# Patient Record
Sex: FEMALE | Race: WHITE | Employment: PART TIME | URBAN - METROPOLITAN AREA
[De-identification: names, ages, dates, MRNs, and addresses within clinical notes are randomized per-mention and may not be internally consistent; named-entity substitution may affect disease eponyms.]

---

## 2017-10-01 ENCOUNTER — APPOINTMENT (OUTPATIENT)
Dept: GENERAL RADIOLOGY | Age: 42
End: 2017-10-01
Payer: MEDICAID

## 2017-10-01 ENCOUNTER — HOSPITAL ENCOUNTER (EMERGENCY)
Age: 42
Discharge: HOME | End: 2017-10-01
Attending: EMERGENCY MEDICINE
Payer: MEDICAID

## 2017-10-01 VITALS
OXYGEN SATURATION: 100 % | WEIGHT: 104 LBS | HEART RATE: 94 BPM | HEIGHT: 65 IN | RESPIRATION RATE: 16 BRPM | BODY MASS INDEX: 17.33 KG/M2 | SYSTOLIC BLOOD PRESSURE: 139 MMHG | TEMPERATURE: 98.3 F | DIASTOLIC BLOOD PRESSURE: 96 MMHG

## 2017-10-01 DIAGNOSIS — J40 BRONCHITIS: Primary | ICD-10-CM

## 2017-10-01 PROCEDURE — 99284 EMERGENCY DEPT VISIT MOD MDM: CPT

## 2017-10-01 PROCEDURE — 71020 XR CHEST STANDARD TWO VW: CPT

## 2017-10-01 PROCEDURE — 6370000000 HC RX 637 (ALT 250 FOR IP): Performed by: PHYSICIAN ASSISTANT

## 2017-10-01 RX ORDER — AZITHROMYCIN 250 MG/1
TABLET, FILM COATED ORAL
Qty: 1 PACKET | Refills: 0 | Status: SHIPPED | OUTPATIENT
Start: 2017-10-01 | End: 2017-10-11

## 2017-10-01 RX ORDER — PREDNISONE 50 MG/1
50 TABLET ORAL DAILY
Qty: 4 TABLET | Refills: 0 | Status: SHIPPED | OUTPATIENT
Start: 2017-10-01 | End: 2018-02-15 | Stop reason: CLARIF

## 2017-10-01 RX ORDER — ACETAMINOPHEN AND CODEINE PHOSPHATE 300; 30 MG/1; MG/1
1 TABLET ORAL 3 TIMES DAILY PRN
Qty: 10 TABLET | Refills: 0 | Status: SHIPPED | OUTPATIENT
Start: 2017-10-01 | End: 2018-02-15 | Stop reason: CLARIF

## 2017-10-01 RX ORDER — ACETAMINOPHEN AND CODEINE PHOSPHATE 300; 30 MG/1; MG/1
2 TABLET ORAL ONCE
Status: COMPLETED | OUTPATIENT
Start: 2017-10-01 | End: 2017-10-01

## 2017-10-01 RX ADMIN — ACETAMINOPHEN AND CODEINE PHOSPHATE 2 TABLET: 300; 30 TABLET ORAL at 19:32

## 2017-10-01 ASSESSMENT — PAIN SCALES - GENERAL
PAINLEVEL_OUTOF10: 5
PAINLEVEL_OUTOF10: 5

## 2017-10-01 ASSESSMENT — PAIN DESCRIPTION - PAIN TYPE: TYPE: ACUTE PAIN

## 2017-10-01 ASSESSMENT — PAIN DESCRIPTION - LOCATION: LOCATION: CHEST

## 2017-10-01 NOTE — ED AVS SNAPSHOT
this packet, you will find information about the topics listed below:    · Instructions about your medications including a list of your home medications  · A summary of your hospital visit  · Follow-up appointments once you have left the hospital  · Your care plan at home      You may receive a survey regarding the care you received during your stay. Your input is valuable to us. We encourage you to complete and return your survey in the envelope provided. We hope you will choose us in the future for your healthcare needs. Patient Information     Patient Name LOCO Murphy 1975      Care Provided at:     Name Address Phone       [address]  [address] [phone]            Your Visit    Here you will find information about your visit, including the reason for your visit. Please take this sheet with you when you visit your doctor or other health care provider in the future. It will help determine the best possible medical care for you at that time. If you have any questions once you leave the hospital, please call the department phone number listed below. Diagnoses this visit     Your diagnosis was BRONCHITIS. Visit Information     Date of Visit Department Dept Phone    10/1/2017 [facility] [phone]      You were seen by     You were seen by Mauro Prasad MD and BARBARA Escalante. Follow-up Appointments    Below is a list of your follow-up and future appointments. This may not be a complete list as you may have made appointments directly with providers that we are not aware of or your providers may have made some for you. Please call your providers to confirm appointments. It is important to keep your appointments. Please bring your current insurance card, photo ID, co-pay, and all medication bottles to your appointment. If self-pay, payment is expected at the time of service.         Follow-up Information Schedule an appointment as soon as possible for a visit with Chandni Fields. Contact information:    [address] [place] [phone]          Follow up with [facility] ED. Specialty:  Emergency Medicine    Why:  For worsening symptoms, or any other concern    Contact information:    Yumiko Garcia [address] [id number] [phone]      Preventive Care        Date Due    HIV screening is recommended for all people regardless of risk factors  aged 15-65 years at least once (lifetime) who have never been HIV tested. 9/14/1989    Tetanus Combination Vaccine (1 - Tdap) 9/14/1993    Pneumococcal Vaccine - Pneumovax for adults aged 19-64 years with: chronic heart disease, chronic lung disease, diabetes mellitus, alcoholism, chronic liver disease, or cigarette smoking. (1 of 1 - PPSV23) 9/14/1993    Pap Smear 9/14/1995    Cholesterol Screening 9/14/2014    Yearly Flu Vaccine (1) 9/1/2017                 Care Plan Once You Return Home    This section includes instructions you will need to follow once you leave the hospital.  Your care team will discuss these with you, so you and those caring for you know how to best care for your health needs at home. This section may also include educational information about certain health topics that may be of help to you. Important Information if you smoke or are exposed to smoking       SMOKING: QUIT SMOKING. THIS IS THE MOST IMPORTANT ACTION YOU CAN TAKE TO IMPROVE YOUR CURRENT AND FUTURE HEALTH. Call the [address] [place] [address] at [place] NOW ([phone])    Smoking harms nonsmokers. When nonsmokers are around people who smoke, they absorb nicotine, carbon monoxide, and other ingredients of tobacco smoke.      DO NOT SMOKE AROUND CHILDREN     Children exposed to secondhand smoke are at an increased risk of:  Sudden Infant Death Syndrome (SIDS), acute respiratory infections, inflammation of the middle ear, and severe asthma. Over a longer time, it causes heart disease and lung cancer. There is no safe level of exposure to secondhand smoke. Important information for a smoker       SMOKING: QUIT SMOKING. THIS IS THE MOST IMPORTANT ACTION YOU CAN TAKE TO IMPROVE YOUR CURRENT AND FUTURE HEALTH. Call the [address] [place] [address] at [place] NOW ([phone])    Smoking harms nonsmokers. When nonsmokers are around people who smoke, they absorb nicotine, carbon monoxide, and other ingredients of tobacco smoke. DO NOT SMOKE AROUND CHILDREN     Children exposed to secondhand smoke are at an increased risk of:  Sudden Infant Death Syndrome (SIDS), acute respiratory infections, inflammation of the middle ear, and severe asthma. Over a longer time, it causes heart disease and lung cancer. There is no safe level of exposure to secondhand smoke. Myfacepage Signup     Myfacepage allows you to send messages to your doctor, view your test results, renew your prescriptions, schedule appointments, view visit notes, and more. How Do I Sign Up? 1. In your Internet browser, go to [email or web address]  2. Click on the Sign Up Now link in the Sign In box. You will see the New Member Sign Up page. 3. Enter your Myfacepage Access Code exactly as it appears below. You will not need to use this code after youve completed the sign-up process. If you do not sign up before the expiration date, you must request a new code. Myfacepage Access Code: [email or web address]  Expires: 11/30/2017  7:56 PM    4. Enter your Social Security Number ([id number]) and Date of Birth (mm/dd/yyyy) as indicated and click Submit. You will be taken to the next sign-up page. 5. Create a Myfacepage ID. This will be your Myfacepage login ID and cannot be changed, so think of one that is secure and easy to remember. 6. Create a Myfacepage password. You can change your password at any time. 7. Enter your Password Reset Question and Answer. This can be used at a later time if you forget your password. 8. Enter your e-mail address. You will receive e-mail notification when new information is available in [address] 9. Click Sign Up. You can now view your medical record. Additional Information  If you have questions, please contact the physician practice where you receive care. Remember, MyChart is NOT to be used for urgent needs. For medical emergencies, dial 911. For questions regarding your MyChart account call [phone]. If you have a clinical question, please call your doctor's office. View your information online  ? Review your current list of  medications, immunization, and allergies. ? Review your future test results online . ? Review your discharge instructions provided by your caregivers at discharge    Certain functionality such as prescription refills, scheduling appointments or sending messages to your provider are not activated if your provider does not use Websense in his/her office    For questions regarding your MyChart account call [phone]. If you have a clinical question, please call your doctor's office. The information on all pages of the After Visit Summary has been reviewed with me, the patient and/or responsible adult, by my health care provider(s). I had the opportunity to ask questions regarding this information. I understand I should dispose of my armband safely at home to protect my health information. A complete copy of the After Visit Summary has been given to me, the patient and/or responsible adult.          Patient Signature/Responsible Adult: ___________________________________    Nurse Signature: ___________________________________  Resident/MLP Signature: ___________________________________  Attending Signature: ___________________________________    Date:____________Time:____________              Discharge Instructions

## 2017-10-01 NOTE — ED PROVIDER NOTES
[address] ED  eMERGENCY dEPARTMENT eNCOUnter      Pt Name: Merlene Seo  MRN: [id number]  [place] 1975  Date of evaluation: 10/1/2017  Provider: BARBARA Pop    CHIEF COMPLAINT       Chief Complaint   Patient presents with    Cough           HISTORY OF PRESENT ILLNESS  (Location/Symptom, Timing/Onset, Context/Setting, Quality, Duration, Modifying Factors, Severity.)   Merlene Seo is a 36 y.o. female who presents to the emergency department with mildly productive cough for the past 4 days. Denies any shortness of breath, chest pain, fevers, chills, abdominal pain, nausea, vomiting. Tobacco use: daily  Location/Symptom: cough  Duration: Intermittent  Modifying Factors: worse when laying flat, better sitting up  Severity: mild    Nursing Notes were reviewed. REVIEW OF SYSTEMS    (2-9 systems for level 4, 10 or more for level 5)     Review of Systems   C/o cough  denies shortness of breath, denies chest pain, denies any fevers or chills, denies any abdominal pain nausea or vomiting    Except as noted above the remainder of the review of systems was reviewed and negative. PAST MEDICAL HISTORY     Past Medical History:   Diagnosis Date    Prolonged emergence from general anesthesia      None otherwise stated in nurses notes    SURGICAL HISTORY       Past Surgical History:   Procedure Laterality Date    FOOT SURGERY Left     LAPAROSCOPY  3/3/16    hysteroscopy with D&C    TUBAL LIGATION       None otherwise stated in nurses notes    CURRENT MEDICATIONS       Discharge Medication List as of 10/1/2017  7:56 PM          ALLERGIES     Bactrim [sulfamethoxazole-trimethoprim]    FAMILY HISTORY           Problem Relation Age of Onset    Diabetes Mother     High Blood Pressure Mother     Cancer Father      Family Status   Relation Status    Mother     Father       None otherwise stated in nurses notes    SOCIAL HISTORY      reports that she has been smoking Cigarettes.   She has a 10.00 pack-year smoking history. She has never used smokeless tobacco. She reports that she does not drink alcohol or use illicit drugs. lives at home with others     PHYSICAL EXAM    (up to 7 for level 4, 8 or more for level 5)   ED Triage Vitals   BP Temp Temp Source Pulse Resp SpO2 Height Weight   10/01/17 1832 10/01/17 1832 10/01/17 1832 10/01/17 1832 10/01/17 1832 10/01/17 1832 10/01/17 1832 10/01/17 1832   139/96 98.3 °F (36.8 °C) Oral 94 16 100 % 5' 5\" (1.651 m) 104 lb (47.2 kg)       Physical Exam   Nursing note and vitals reviewed. Constitutional: Oriented to person, place, and time and well-developed, well-nourished. HENT: Normocephalic and atraumatic. External ears normal. Nose normal and midline. Eyes: Conjunctivae and EOM are normal. Pupils are equal, round, and reactive to light. Throat: Posterior pharynx is without erythema or exudates, airway is patent, no swelling  Cardiovascular: Normal rate, regular rhythm, normal heart sounds and intact distal pulses. Pulmonary/Chest: Effort normal and breath sounds normal. No respiratory distress. no wheezes. No rales. No chest tenderness. Coughing during exam  Musculoskeletal: Normal range of motion. Skin: Skin is warm and dry. No rash noted. No erythema. No pallor. no diaphoresis             DIAGNOSTIC RESULTS     EKG: All EKG's are interpreted by the Emergency Department Physician who either signs or Co-signs this chart in the absence of a cardiologist.        RADIOLOGY:   All plain film, CT, MRI, and formal ultrasound images (except ED bedside ultrasound) are read by the radiologist and the images and interpretations are directly viewed by the emergency physician. XR CHEST STANDARD (2 VW)   Final Result   1. No acute cardiopulmonary disease. LABS:  Labs Reviewed - No data to display    All other labs were within normal range or not returned as of this dictation.     EMERGENCY DEPARTMENT COURSE and DIFFERENTIAL DIAGNOSIS/MDM: Vitals:    Vitals:    10/01/17 1832   BP: (!) 139/96   Pulse: 94   Resp: 16   Temp: 98.3 °F (36.8 °C)   TempSrc: Oral   SpO2: 100%   Weight: 104 lb (47.2 kg)   Height: 5' 5\" (1.651 m)       t3 helped with cough. Pt is resting comfortably, no difficulty breathing, no distress. Instructed to return to the emergency room if symptoms worsen, return, or any other concern right away which is agreed by the patient. Instructed to f/u with PCP in 2-3 days for re-evaluation. ED MEDS:  Orders Placed This Encounter   Medications    acetaminophen-codeine (TYLENOL #3) 300-30 MG per tablet 2 tablet    azithromycin (ZITHROMAX) 250 MG tablet     Sig: Take 2 tablets (500 mg) on Day 1, followed by 1 tablet (250 mg) once daily on Days 2 through 5. Dispense:  1 packet     Refill:  0    predniSONE (DELTASONE) 50 MG tablet     Sig: Take 1 tablet by mouth daily     Dispense:  4 tablet     Refill:  0    acetaminophen-codeine (TYLENOL/CODEINE #3) 300-30 MG per tablet     Sig: Take 1 tablet by mouth 3 times daily as needed for Pain     Dispense:  10 tablet     Refill:  0         CONSULTS:  None    PROCEDURES:  None      FINAL IMPRESSION      1.  Bronchitis          DISPOSITION/PLAN   DISPOSITION Decision To Discharge    PATIENT REFERRED TO:  Annika Tommiealise  [address] [place] [phone]    Schedule an appointment as soon as possible for a visit      [facility] ED  [facility]  [address] [facility]  [phone]    For worsening symptoms, or any other concern      DISCHARGE MEDICATIONS:  Discharge Medication List as of 10/1/2017  7:56 PM      START taking these medications    Details   azithromycin (ZITHROMAX) 250 MG tablet Take 2 tablets (500 mg) on Day 1, followed by 1 tablet (250 mg) once daily on Days 2 through 5., Disp-1 packet, R-0Print      predniSONE (DELTASONE) 50 MG tablet Take 1 tablet by mouth daily, Disp-4 tablet, R-0Print      acetaminophen-codeine (TYLENOL/CODEINE #3) 300-30 MG per tablet Take 1 tablet by mouth 3 times daily as needed for Pain, Disp-10 tablet, R-0Print             (Please note that portions of this note were completed with a voice recognition program.  Efforts were made to edit the dictations but occasionally words are mis-transcribed.)    Elyse Gore, [address] [place] [address]  10/01/17 2019

## 2018-02-15 ENCOUNTER — OFFICE VISIT (OUTPATIENT)
Dept: OBGYN CLINIC | Age: 43
End: 2018-02-15
Payer: COMMERCIAL

## 2018-02-15 VITALS
HEART RATE: 76 BPM | DIASTOLIC BLOOD PRESSURE: 90 MMHG | SYSTOLIC BLOOD PRESSURE: 131 MMHG | RESPIRATION RATE: 18 BRPM | HEIGHT: 65 IN | BODY MASS INDEX: 20.16 KG/M2 | WEIGHT: 121 LBS

## 2018-02-15 DIAGNOSIS — N92.6 IRREGULAR BLEEDING: ICD-10-CM

## 2018-02-15 DIAGNOSIS — R10.2 PELVIC PAIN IN FEMALE: Primary | ICD-10-CM

## 2018-02-15 PROCEDURE — G8484 FLU IMMUNIZE NO ADMIN: HCPCS | Performed by: SPECIALIST

## 2018-02-15 PROCEDURE — 99213 OFFICE O/P EST LOW 20 MIN: CPT | Performed by: SPECIALIST

## 2018-02-15 PROCEDURE — G8427 DOCREV CUR MEDS BY ELIG CLIN: HCPCS | Performed by: SPECIALIST

## 2018-02-15 PROCEDURE — 4004F PT TOBACCO SCREEN RCVD TLK: CPT | Performed by: SPECIALIST

## 2018-02-15 PROCEDURE — G8420 CALC BMI NORM PARAMETERS: HCPCS | Performed by: SPECIALIST

## 2018-02-15 ASSESSMENT — ENCOUNTER SYMPTOMS
VOMITING: 0
CONSTIPATION: 0
NAUSEA: 0
ABDOMINAL DISTENTION: 0
DIARRHEA: 0
ABDOMINAL PAIN: 0
COUGH: 0
EYE PAIN: 0
APNEA: 0

## 2018-02-15 NOTE — PROGRESS NOTES
Subjective:      Patient ID: Lissette Vergara is a 36 y.o. female. Chief Complaint   Patient presents with    Menstrual Problem     patient is here today because she is having periods every 2 weeks. This started happening in December. She is also having constant pelvic pain. Pain is is relieved with ibuprofen. She has been having abdominal swelling. BP (!) 131/90 (Site: Right Arm, Position: Sitting, Cuff Size: Medium Adult)   Pulse 76   Resp 18   Ht 5' 5\" (1.651 m)   Wt 121 lb (54.9 kg)   LMP 02/15/2018   BMI 20.14 kg/m²   Patient's last menstrual period was 02/15/2018. [id number]    Past Medical History:   Diagnosis Date    Prolonged emergence from general anesthesia      No current Epic-ordered outpatient prescriptions on file. No current Epic-ordered facility-administered medications on file. Problem List Items Addressed This Visit     Pelvic pain in female - Primary    Relevant Orders    US Non OB Transvaginal    US Pelvis Complete      Other Visit Diagnoses     Irregular bleeding        Relevant Orders    US Non OB Transvaginal    US Pelvis Complete        Allergies   Allergen Reactions    Bactrim [Sulfamethoxazole-Trimethoprim]      Orders Placed This Encounter   Procedures    US Non OB Transvaginal     Begin with transabdominal imaging. Standing Status:   Future     Standing Expiration Date:   2/15/2019     Order Specific Question:   Reason for exam:     Answer:   abnormal bleeding    US Pelvis Complete     Standing Status:   Future     Standing Expiration Date:   2/15/2019        Patient is here today complaining of irregular irregular bleeding and pelvic pain. She states that she has been having periods every 2 weeks since December 2017. She states that the pain is constant but does feel better when she take Ibuprofen. She states that her abdomen swells with the pain and she his very uncomfortable. She denies nausea, vomiting and fever.          Review of Systems Constitutional: Negative for activity change, appetite change and fever. HENT: Negative for ear discharge and ear pain. Eyes: Negative for pain and visual disturbance. Respiratory: Negative for apnea and cough. Cardiovascular: Negative for chest pain, palpitations and leg swelling. Gastrointestinal: Negative for abdominal distention, abdominal pain, constipation, diarrhea, nausea and vomiting. Endocrine: Negative. Genitourinary: Positive for menstrual problem and pelvic pain. Negative for difficulty urinating and dysuria. Musculoskeletal: Negative for neck pain and neck stiffness. Skin: Negative. Neurological: Negative for light-headedness and numbness. Hematological: Negative. Does not bruise/bleed easily. Objective:   Physical Exam   Constitutional: She is oriented to person, place, and time. Vital signs are normal. She appears well-developed and well-nourished. HENT:   Head: Normocephalic and atraumatic. Neck: Normal range of motion. Neck supple. No thyromegaly present. Cardiovascular: Normal rate and regular rhythm. Pulmonary/Chest: Effort normal and breath sounds normal. She has no wheezes. Abdominal: Soft. Bowel sounds are normal. She exhibits no distension and no mass. There is no tenderness. There is no guarding. Musculoskeletal: Normal range of motion. Neurological: She is alert and oriented to person, place, and time. Skin: Skin is dry. Psychiatric: She has a normal mood and affect. Her behavior is normal. Thought content normal.   Nursing note and vitals reviewed. Assessment:       Patient with pelvic pain and irregular bleeding. Will evaluate with ultrasound. Patient with elevated blood pressure. She was advised to see her family physician for evaluation. Plan:      Orders Placed This Encounter   Procedures    US Non OB Transvaginal    US Pelvis Complete     Appointment for ultrasound.     Pietro Love am scribing for, and in

## 2018-02-26 ENCOUNTER — OFFICE VISIT (OUTPATIENT)
Dept: OBGYN CLINIC | Age: 43
End: 2018-02-26
Payer: COMMERCIAL

## 2018-02-26 DIAGNOSIS — R10.2 PELVIC PAIN IN FEMALE: ICD-10-CM

## 2018-02-26 DIAGNOSIS — N92.6 IRREGULAR BLEEDING: ICD-10-CM

## 2018-02-26 PROCEDURE — 76856 US EXAM PELVIC COMPLETE: CPT | Performed by: SPECIALIST

## 2018-02-26 PROCEDURE — 76830 TRANSVAGINAL US NON-OB: CPT | Performed by: SPECIALIST

## 2018-02-26 NOTE — PROGRESS NOTES
Please refer to the attached ultrasound report for doctors evaluation, along with management recommendations.

## 2018-02-28 ENCOUNTER — OFFICE VISIT (OUTPATIENT)
Dept: OBGYN CLINIC | Age: 43
End: 2018-02-28
Payer: COMMERCIAL

## 2018-02-28 VITALS
HEIGHT: 65 IN | WEIGHT: 123.8 LBS | BODY MASS INDEX: 20.62 KG/M2 | DIASTOLIC BLOOD PRESSURE: 85 MMHG | SYSTOLIC BLOOD PRESSURE: 130 MMHG | HEART RATE: 89 BPM

## 2018-02-28 DIAGNOSIS — R10.2 PELVIC PAIN IN FEMALE: ICD-10-CM

## 2018-02-28 DIAGNOSIS — N92.6 IRREGULAR BLEEDING: ICD-10-CM

## 2018-02-28 DIAGNOSIS — D25.9 UTERINE LEIOMYOMA, UNSPECIFIED LOCATION: Primary | ICD-10-CM

## 2018-02-28 PROCEDURE — 99213 OFFICE O/P EST LOW 20 MIN: CPT | Performed by: SPECIALIST

## 2018-02-28 PROCEDURE — G8484 FLU IMMUNIZE NO ADMIN: HCPCS | Performed by: SPECIALIST

## 2018-02-28 PROCEDURE — G8427 DOCREV CUR MEDS BY ELIG CLIN: HCPCS | Performed by: SPECIALIST

## 2018-02-28 PROCEDURE — G8420 CALC BMI NORM PARAMETERS: HCPCS | Performed by: SPECIALIST

## 2018-02-28 PROCEDURE — 4004F PT TOBACCO SCREEN RCVD TLK: CPT | Performed by: SPECIALIST

## 2018-02-28 RX ORDER — IBUPROFEN 200 MG
200 TABLET ORAL EVERY 6 HOURS PRN
Status: ON HOLD | COMMUNITY
End: 2018-04-20 | Stop reason: HOSPADM

## 2018-02-28 ASSESSMENT — ENCOUNTER SYMPTOMS
VOMITING: 0
ABDOMINAL PAIN: 0
APNEA: 0
NAUSEA: 0
CONSTIPATION: 0
ABDOMINAL DISTENTION: 0
EYE PAIN: 0
DIARRHEA: 0
COUGH: 0

## 2018-02-28 NOTE — PROGRESS NOTES
pain. Negative for difficulty urinating and dysuria. Musculoskeletal: Negative for neck pain and neck stiffness. Skin: Negative. Neurological: Negative for light-headedness and numbness. Hematological: Negative. Does not bruise/bleed easily. Objective:   Physical Exam   Constitutional: She is oriented to person, place, and time. Vital signs are normal. She appears well-developed and well-nourished. HENT:   Head: Normocephalic and atraumatic. Neck: Normal range of motion. Neck supple. No thyromegaly present. Cardiovascular: Normal rate and regular rhythm. Pulmonary/Chest: Effort normal and breath sounds normal. She has no wheezes. Abdominal: Soft. Bowel sounds are normal. She exhibits no distension and no mass. There is no tenderness. There is no guarding. Musculoskeletal: Normal range of motion. Neurological: She is alert and oriented to person, place, and time. Skin: Skin is dry. Psychiatric: She has a normal mood and affect. Her behavior is normal. Thought content normal.   Nursing note and vitals reviewed. Assessment:      Patient with pelvic pain and irregular bleeding here to discuss the result of her ultrasound. Ultrasound showing uterine fibroid was reviewed and explained to patient. Treatment options were discussed, including expectant management. As patient is a chronic smoker, she is not a candidate for hormone therapy. Due to the pain and irregular bleeding, patient requests hysterectomy. Will schedule patient for LAVH. Discussed surgical procedure, risks and benefits, and all questions were answered. Plan:      Schedule for LAVH. Antony Vargas am scribing for, and in the presence of Dr. Cora Prajapati. Electronically signed by: Megan Alexander 2/28/18 4:56 PM       I agree to the above documentation placed by my scribe Megan Alexander. I reviewed the scribe's note and agree with the documented findings and plan of care.  Any areas of disagreement are noted on the chart. I have personally evaluated this patient. Additional findings are as noted. I agree with the chief complaint, past medical history, past surgical history, allergies, medications, social and family history as documented unless otherwise noted below.      Electronically signed by Raffi Hollis MD on 3/1/2018 at 3:43 AM

## 2018-03-08 ENCOUNTER — TELEPHONE (OUTPATIENT)
Dept: OBGYN CLINIC | Age: 43
End: 2018-03-08

## 2018-03-08 NOTE — LETTER
[address] [place] [address] GYN  [facility] [address]  [address] [address]  Phone: [phone]  Fax: [phone]    Mae Rowley MD        March 8, 2018    Lissette Vergara  [address]      Dear Sid Nascimento: Thank you for entrusting me to be a part of your healthcare team!    Your surgical procedure known as vaginal hysterectomy has been scheduled at [facility].      Your pre-admission testing is scheduled for 4/5/18 at 10L30 am . Your surgery is scheduled on 4/19/18 and you need to arrive by 6:00 am. Your post-operative appointment to assess your health and review your results is scheduled on 4/26/18 at 2:30 pm in my office. Please do not eat, drink or smoke after midnight the evening before your surgery. Also, please remember, it is very important to keep your appointment for pre-admission testing. Please take all the medications that you take to your pre-admission testing appointment. Your anticipated recovery time is  6 weeks. If you have any questions do not hesitate to contact my office. You may e-mail us using your MyChart or call us at [phone].     Sincerely,           Karyna Casiano MD, Ashley Silva

## 2018-04-05 ENCOUNTER — HOSPITAL ENCOUNTER (OUTPATIENT)
Dept: PREADMISSION TESTING | Age: 43
Discharge: HOME | End: 2018-04-09
Payer: COMMERCIAL

## 2018-04-05 VITALS
BODY MASS INDEX: 20.18 KG/M2 | RESPIRATION RATE: 12 BRPM | SYSTOLIC BLOOD PRESSURE: 129 MMHG | HEART RATE: 95 BPM | OXYGEN SATURATION: 95 % | HEIGHT: 65 IN | WEIGHT: 121.1 LBS | TEMPERATURE: 98.1 F | DIASTOLIC BLOOD PRESSURE: 85 MMHG

## 2018-04-05 LAB
ABO/RH: NORMAL
ABSOLUTE EOS #: 0.1 K/UL (ref 0–0.4)
ABSOLUTE IMMATURE GRANULOCYTE: ABNORMAL K/UL (ref 0–0.3)
ABSOLUTE LYMPH #: 3.1 K/UL (ref 1–4.8)
ABSOLUTE MONO #: 0.8 K/UL (ref 0.1–1.3)
ANION GAP SERPL CALCULATED.3IONS-SCNC: 13 MMOL/L (ref 9–17)
ANTIBODY SCREEN: NEGATIVE
ARM BAND NUMBER: NORMAL
BASOPHILS # BLD: 1 % (ref 0–2)
BASOPHILS ABSOLUTE: 0 K/UL (ref 0–0.2)
BLOOD BANK COMMENT: NORMAL
BUN BLDV-MCNC: 7 MG/DL (ref 6–20)
BUN/CREAT BLD: ABNORMAL (ref 9–20)
CALCIUM SERPL-MCNC: 8.7 MG/DL (ref 8.6–10.4)
CHLORIDE BLD-SCNC: 103 MMOL/L (ref 98–107)
CO2: 24 MMOL/L (ref 20–31)
CREAT SERPL-MCNC: 0.63 MG/DL (ref 0.5–0.9)
DIFFERENTIAL TYPE: ABNORMAL
EOSINOPHILS RELATIVE PERCENT: 1 % (ref 0–4)
EXPIRATION DATE: NORMAL
GFR AFRICAN AMERICAN: >60 ML/MIN
GFR NON-AFRICAN AMERICAN: >60 ML/MIN
GFR SERPL CREATININE-BSD FRML MDRD: ABNORMAL ML/MIN/{1.73_M2}
GFR SERPL CREATININE-BSD FRML MDRD: ABNORMAL ML/MIN/{1.73_M2}
GLUCOSE BLD-MCNC: 69 MG/DL (ref 70–99)
HCT VFR BLD CALC: 43.3 % (ref 36–46)
HEMOGLOBIN: 14.5 G/DL (ref 12–16)
IMMATURE GRANULOCYTES: ABNORMAL %
LYMPHOCYTES # BLD: 33 % (ref 24–44)
MCH RBC QN AUTO: 29.7 PG (ref 26–34)
MCHC RBC AUTO-ENTMCNC: 33.4 G/DL (ref 31–37)
MCV RBC AUTO: 88.8 FL (ref 80–100)
MONOCYTES # BLD: 9 % (ref 1–7)
NRBC AUTOMATED: ABNORMAL PER 100 WBC
PDW BLD-RTO: 14.7 % (ref 11.5–14.9)
PLATELET # BLD: 331 K/UL (ref 150–450)
PLATELET ESTIMATE: ABNORMAL
PMV BLD AUTO: 8.6 FL (ref 6–12)
POTASSIUM SERPL-SCNC: 4.4 MMOL/L (ref 3.7–5.3)
RBC # BLD: 4.87 M/UL (ref 4–5.2)
RBC # BLD: ABNORMAL 10*6/UL
SEG NEUTROPHILS: 56 % (ref 36–66)
SEGMENTED NEUTROPHILS ABSOLUTE COUNT: 5.4 K/UL (ref 1.3–9.1)
SODIUM BLD-SCNC: 140 MMOL/L (ref 135–144)
WBC # BLD: 9.5 K/UL (ref 3.5–11)
WBC # BLD: ABNORMAL 10*3/UL

## 2018-04-05 PROCEDURE — 86901 BLOOD TYPING SEROLOGIC RH(D): CPT

## 2018-04-05 PROCEDURE — 85025 COMPLETE CBC W/AUTO DIFF WBC: CPT

## 2018-04-05 PROCEDURE — 86850 RBC ANTIBODY SCREEN: CPT

## 2018-04-05 PROCEDURE — 86900 BLOOD TYPING SEROLOGIC ABO: CPT

## 2018-04-05 PROCEDURE — 36415 COLL VENOUS BLD VENIPUNCTURE: CPT

## 2018-04-05 PROCEDURE — 80048 BASIC METABOLIC PNL TOTAL CA: CPT

## 2018-04-06 ENCOUNTER — ANESTHESIA EVENT (OUTPATIENT)
Dept: OPERATING ROOM | Age: 43
End: 2018-04-06
Payer: COMMERCIAL

## 2018-04-19 ENCOUNTER — ANESTHESIA (OUTPATIENT)
Dept: OPERATING ROOM | Age: 43
End: 2018-04-19
Payer: COMMERCIAL

## 2018-04-19 ENCOUNTER — HOSPITAL ENCOUNTER (OUTPATIENT)
Age: 43
Discharge: HOME | End: 2018-04-20
Attending: SPECIALIST | Admitting: SPECIALIST
Payer: COMMERCIAL

## 2018-04-19 VITALS — OXYGEN SATURATION: 100 % | TEMPERATURE: 97.3 F | DIASTOLIC BLOOD PRESSURE: 59 MMHG | SYSTOLIC BLOOD PRESSURE: 99 MMHG

## 2018-04-19 DIAGNOSIS — Z90.710 S/P LAPAROSCOPIC HYSTERECTOMY: Primary | ICD-10-CM

## 2018-04-19 PROBLEM — Z98.51 HISTORY OF TUBAL LIGATION: Status: ACTIVE | Noted: 2018-04-19

## 2018-04-19 PROBLEM — O00.90 ECTOPIC PREGNANCY: Status: ACTIVE | Noted: 2018-04-19

## 2018-04-19 LAB
-: ABNORMAL
-: NORMAL
ABSOLUTE EOS #: 0 K/UL (ref 0–0.4)
ABSOLUTE IMMATURE GRANULOCYTE: ABNORMAL K/UL (ref 0–0.3)
ABSOLUTE LYMPH #: 0.89 K/UL (ref 1–4.8)
ABSOLUTE MONO #: 0.3 K/UL (ref 0.1–1.3)
AMORPHOUS: ABNORMAL
BACTERIA: ABNORMAL
BASOPHILS # BLD: 0 % (ref 0–2)
BASOPHILS ABSOLUTE: 0 K/UL (ref 0–0.2)
BILIRUBIN URINE: ABNORMAL
CASTS UA: ABNORMAL /LPF
COLOR: YELLOW
COMMENT UA: ABNORMAL
CRYSTALS, UA: ABNORMAL /HPF
DIFFERENTIAL TYPE: ABNORMAL
EOSINOPHILS RELATIVE PERCENT: 0 % (ref 0–4)
EPITHELIAL CELLS UA: ABNORMAL /HPF
GLUCOSE URINE: NEGATIVE
HCG, PREGNANCY URINE (POC): NEGATIVE
HCT VFR BLD CALC: 38.3 % (ref 36–46)
HEMOGLOBIN: 12.6 G/DL (ref 12–16)
IMMATURE GRANULOCYTES: ABNORMAL %
KETONES, URINE: ABNORMAL
LEUKOCYTE ESTERASE, URINE: NEGATIVE
LYMPHOCYTES # BLD: 6 % (ref 24–44)
MCH RBC QN AUTO: 29.1 PG (ref 26–34)
MCHC RBC AUTO-ENTMCNC: 32.9 G/DL (ref 31–37)
MCV RBC AUTO: 88.5 FL (ref 80–100)
MONOCYTES # BLD: 2 % (ref 1–7)
MORPHOLOGY: NORMAL
MUCUS: ABNORMAL
NITRITE, URINE: NEGATIVE
NRBC AUTOMATED: ABNORMAL PER 100 WBC
OTHER OBSERVATIONS UA: ABNORMAL
PDW BLD-RTO: 14.4 % (ref 11.5–14.9)
PH UA: 6 (ref 5–8)
PLATELET # BLD: 267 K/UL (ref 150–450)
PLATELET ESTIMATE: ABNORMAL
PMV BLD AUTO: 9.1 FL (ref 6–12)
PROTEIN UA: NEGATIVE
RBC # BLD: 4.33 M/UL (ref 4–5.2)
RBC # BLD: ABNORMAL 10*6/UL
RBC UA: ABNORMAL /HPF
RENAL EPITHELIAL, UA: ABNORMAL /HPF
SEG NEUTROPHILS: 92 % (ref 36–66)
SEGMENTED NEUTROPHILS ABSOLUTE COUNT: 13.71 K/UL (ref 1.3–9.1)
SPECIFIC GRAVITY UA: 1.02 (ref 1–1.03)
TRICHOMONAS: ABNORMAL
TURBIDITY: ABNORMAL
URINE HGB: ABNORMAL
UROBILINOGEN, URINE: NORMAL
WBC # BLD: 14.9 K/UL (ref 3.5–11)
WBC # BLD: ABNORMAL 10*3/UL
WBC UA: ABNORMAL /HPF
YEAST: ABNORMAL

## 2018-04-19 PROCEDURE — 6360000002 HC RX W HCPCS: Performed by: ANESTHESIOLOGY

## 2018-04-19 PROCEDURE — 6360000002 HC RX W HCPCS: Performed by: NURSE ANESTHETIST, CERTIFIED REGISTERED

## 2018-04-19 PROCEDURE — 85025 COMPLETE CBC W/AUTO DIFF WBC: CPT

## 2018-04-19 PROCEDURE — 6360000002 HC RX W HCPCS: Performed by: OBSTETRICS & GYNECOLOGY

## 2018-04-19 PROCEDURE — 87086 URINE CULTURE/COLONY COUNT: CPT

## 2018-04-19 PROCEDURE — 3600000014 HC SURGERY LEVEL 4 ADDTL 15MIN: Performed by: SPECIALIST

## 2018-04-19 PROCEDURE — 2580000003 HC RX 258: Performed by: ANESTHESIOLOGY

## 2018-04-19 PROCEDURE — 2720000010 HC SURG SUPPLY STERILE: Performed by: SPECIALIST

## 2018-04-19 PROCEDURE — A6402 STERILE GAUZE <= 16 SQ IN: HCPCS | Performed by: SPECIALIST

## 2018-04-19 PROCEDURE — 7100000001 HC PACU RECOVERY - ADDTL 15 MIN: Performed by: SPECIALIST

## 2018-04-19 PROCEDURE — 7100000000 HC PACU RECOVERY - FIRST 15 MIN: Performed by: SPECIALIST

## 2018-04-19 PROCEDURE — 88307 TISSUE EXAM BY PATHOLOGIST: CPT

## 2018-04-19 PROCEDURE — 84703 CHORIONIC GONADOTROPIN ASSAY: CPT

## 2018-04-19 PROCEDURE — 58552 LAPARO-VAG HYST INCL T/O: CPT | Performed by: SPECIALIST

## 2018-04-19 PROCEDURE — 6370000000 HC RX 637 (ALT 250 FOR IP): Performed by: OBSTETRICS & GYNECOLOGY

## 2018-04-19 PROCEDURE — 3700000000 HC ANESTHESIA ATTENDED CARE: Performed by: SPECIALIST

## 2018-04-19 PROCEDURE — 3700000001 HC ADD 15 MINUTES (ANESTHESIA): Performed by: SPECIALIST

## 2018-04-19 PROCEDURE — 3600000004 HC SURGERY LEVEL 4 BASE: Performed by: SPECIALIST

## 2018-04-19 PROCEDURE — 36415 COLL VENOUS BLD VENIPUNCTURE: CPT

## 2018-04-19 PROCEDURE — 2500000003 HC RX 250 WO HCPCS: Performed by: SPECIALIST

## 2018-04-19 PROCEDURE — 81001 URINALYSIS AUTO W/SCOPE: CPT

## 2018-04-19 PROCEDURE — 2500000003 HC RX 250 WO HCPCS: Performed by: NURSE ANESTHETIST, CERTIFIED REGISTERED

## 2018-04-19 PROCEDURE — 2780000010 HC IMPLANT OTHER: Performed by: SPECIALIST

## 2018-04-19 PROCEDURE — 2580000003 HC RX 258: Performed by: OBSTETRICS & GYNECOLOGY

## 2018-04-19 RX ORDER — ONDANSETRON 2 MG/ML
4 INJECTION INTRAMUSCULAR; INTRAVENOUS EVERY 6 HOURS PRN
Status: DISCONTINUED | OUTPATIENT
Start: 2018-04-19 | End: 2018-04-19

## 2018-04-19 RX ORDER — SODIUM CHLORIDE 9 MG/ML
INJECTION, SOLUTION INTRAVENOUS CONTINUOUS
Status: DISCONTINUED | OUTPATIENT
Start: 2018-04-19 | End: 2018-04-20 | Stop reason: HOSPADM

## 2018-04-19 RX ORDER — SODIUM CHLORIDE, SODIUM LACTATE, POTASSIUM CHLORIDE, CALCIUM CHLORIDE 600; 310; 30; 20 MG/100ML; MG/100ML; MG/100ML; MG/100ML
INJECTION, SOLUTION INTRAVENOUS CONTINUOUS
Status: DISCONTINUED | OUTPATIENT
Start: 2018-04-19 | End: 2018-04-19

## 2018-04-19 RX ORDER — FENTANYL CITRATE 50 UG/ML
50 INJECTION, SOLUTION INTRAMUSCULAR; INTRAVENOUS EVERY 5 MIN PRN
Status: DISCONTINUED | OUTPATIENT
Start: 2018-04-19 | End: 2018-04-19 | Stop reason: HOSPADM

## 2018-04-19 RX ORDER — MORPHINE SULFATE 2 MG/ML
2 INJECTION, SOLUTION INTRAMUSCULAR; INTRAVENOUS EVERY 5 MIN PRN
Status: DISCONTINUED | OUTPATIENT
Start: 2018-04-19 | End: 2018-04-19 | Stop reason: HOSPADM

## 2018-04-19 RX ORDER — LABETALOL HYDROCHLORIDE 5 MG/ML
5 INJECTION, SOLUTION INTRAVENOUS EVERY 10 MIN PRN
Status: DISCONTINUED | OUTPATIENT
Start: 2018-04-19 | End: 2018-04-19 | Stop reason: HOSPADM

## 2018-04-19 RX ORDER — MORPHINE SULFATE 2 MG/ML
2 INJECTION, SOLUTION INTRAMUSCULAR; INTRAVENOUS EVERY 4 HOURS PRN
Status: DISCONTINUED | OUTPATIENT
Start: 2018-04-19 | End: 2018-04-19

## 2018-04-19 RX ORDER — DEXAMETHASONE SODIUM PHOSPHATE 4 MG/ML
INJECTION, SOLUTION INTRA-ARTICULAR; INTRALESIONAL; INTRAMUSCULAR; INTRAVENOUS; SOFT TISSUE PRN
Status: DISCONTINUED | OUTPATIENT
Start: 2018-04-19 | End: 2018-04-19 | Stop reason: SDUPTHER

## 2018-04-19 RX ORDER — BUPIVACAINE HYDROCHLORIDE 5 MG/ML
INJECTION, SOLUTION EPIDURAL; INTRACAUDAL PRN
Status: DISCONTINUED | OUTPATIENT
Start: 2018-04-19 | End: 2018-04-19 | Stop reason: HOSPADM

## 2018-04-19 RX ORDER — OXYCODONE HYDROCHLORIDE AND ACETAMINOPHEN 5; 325 MG/1; MG/1
2 TABLET ORAL EVERY 4 HOURS PRN
Status: DISCONTINUED | OUTPATIENT
Start: 2018-04-20 | End: 2018-04-20 | Stop reason: HOSPADM

## 2018-04-19 RX ORDER — PROPOFOL 10 MG/ML
INJECTION, EMULSION INTRAVENOUS PRN
Status: DISCONTINUED | OUTPATIENT
Start: 2018-04-19 | End: 2018-04-19 | Stop reason: SDUPTHER

## 2018-04-19 RX ORDER — HYDROCODONE BITARTRATE AND ACETAMINOPHEN 5; 325 MG/1; MG/1
1 TABLET ORAL PRN
Status: DISCONTINUED | OUTPATIENT
Start: 2018-04-19 | End: 2018-04-19 | Stop reason: HOSPADM

## 2018-04-19 RX ORDER — HEPARIN SODIUM 5000 [USP'U]/ML
5000 INJECTION, SOLUTION INTRAVENOUS; SUBCUTANEOUS EVERY 12 HOURS
Status: COMPLETED | OUTPATIENT
Start: 2018-04-19 | End: 2018-04-20

## 2018-04-19 RX ORDER — FENTANYL CITRATE 50 UG/ML
25 INJECTION, SOLUTION INTRAMUSCULAR; INTRAVENOUS EVERY 5 MIN PRN
Status: DISCONTINUED | OUTPATIENT
Start: 2018-04-19 | End: 2018-04-19 | Stop reason: HOSPADM

## 2018-04-19 RX ORDER — DIPHENHYDRAMINE HCL 25 MG
25 TABLET ORAL EVERY 6 HOURS PRN
Status: DISCONTINUED | OUTPATIENT
Start: 2018-04-19 | End: 2018-04-20 | Stop reason: HOSPADM

## 2018-04-19 RX ORDER — ROCURONIUM BROMIDE 10 MG/ML
INJECTION, SOLUTION INTRAVENOUS PRN
Status: DISCONTINUED | OUTPATIENT
Start: 2018-04-19 | End: 2018-04-19 | Stop reason: SDUPTHER

## 2018-04-19 RX ORDER — MEPERIDINE HYDROCHLORIDE 50 MG/ML
12.5 INJECTION INTRAMUSCULAR; INTRAVENOUS; SUBCUTANEOUS EVERY 5 MIN PRN
Status: DISCONTINUED | OUTPATIENT
Start: 2018-04-19 | End: 2018-04-19 | Stop reason: HOSPADM

## 2018-04-19 RX ORDER — SENNA AND DOCUSATE SODIUM 50; 8.6 MG/1; MG/1
1 TABLET, FILM COATED ORAL DAILY
Status: DISCONTINUED | OUTPATIENT
Start: 2018-04-19 | End: 2018-04-20 | Stop reason: HOSPADM

## 2018-04-19 RX ORDER — OXYCODONE HYDROCHLORIDE AND ACETAMINOPHEN 5; 325 MG/1; MG/1
1 TABLET ORAL EVERY 4 HOURS PRN
Status: DISCONTINUED | OUTPATIENT
Start: 2018-04-20 | End: 2018-04-20 | Stop reason: HOSPADM

## 2018-04-19 RX ORDER — SODIUM CHLORIDE 0.9 % (FLUSH) 0.9 %
10 SYRINGE (ML) INJECTION EVERY 12 HOURS SCHEDULED
Status: DISCONTINUED | OUTPATIENT
Start: 2018-04-19 | End: 2018-04-19

## 2018-04-19 RX ORDER — CEFAZOLIN SODIUM 1 G/3ML
INJECTION, POWDER, FOR SOLUTION INTRAMUSCULAR; INTRAVENOUS
Status: DISPENSED
Start: 2018-04-19 | End: 2018-04-19

## 2018-04-19 RX ORDER — NEOSTIGMINE METHYLSULFATE 1 MG/ML
INJECTION, SOLUTION INTRAVENOUS PRN
Status: DISCONTINUED | OUTPATIENT
Start: 2018-04-19 | End: 2018-04-19 | Stop reason: SDUPTHER

## 2018-04-19 RX ORDER — SODIUM CHLORIDE 0.9 % (FLUSH) 0.9 %
10 SYRINGE (ML) INJECTION 2 TIMES DAILY
Status: DISCONTINUED | OUTPATIENT
Start: 2018-04-20 | End: 2018-04-20 | Stop reason: HOSPADM

## 2018-04-19 RX ORDER — ONDANSETRON 2 MG/ML
INJECTION INTRAMUSCULAR; INTRAVENOUS PRN
Status: DISCONTINUED | OUTPATIENT
Start: 2018-04-19 | End: 2018-04-19 | Stop reason: SDUPTHER

## 2018-04-19 RX ORDER — SODIUM CHLORIDE 0.9 % (FLUSH) 0.9 %
10 SYRINGE (ML) INJECTION PRN
Status: DISCONTINUED | OUTPATIENT
Start: 2018-04-19 | End: 2018-04-19

## 2018-04-19 RX ORDER — FENTANYL CITRATE 50 UG/ML
INJECTION, SOLUTION INTRAMUSCULAR; INTRAVENOUS PRN
Status: DISCONTINUED | OUTPATIENT
Start: 2018-04-19 | End: 2018-04-19 | Stop reason: SDUPTHER

## 2018-04-19 RX ORDER — DIPHENHYDRAMINE HYDROCHLORIDE 50 MG/ML
12.5 INJECTION INTRAMUSCULAR; INTRAVENOUS
Status: DISCONTINUED | OUTPATIENT
Start: 2018-04-19 | End: 2018-04-19 | Stop reason: HOSPADM

## 2018-04-19 RX ORDER — KETOROLAC TROMETHAMINE 30 MG/ML
30 INJECTION, SOLUTION INTRAMUSCULAR; INTRAVENOUS EVERY 6 HOURS
Status: DISCONTINUED | OUTPATIENT
Start: 2018-04-19 | End: 2018-04-19

## 2018-04-19 RX ORDER — ONDANSETRON 2 MG/ML
4 INJECTION INTRAMUSCULAR; INTRAVENOUS
Status: DISCONTINUED | OUTPATIENT
Start: 2018-04-19 | End: 2018-04-19 | Stop reason: HOSPADM

## 2018-04-19 RX ORDER — DOCUSATE SODIUM 100 MG/1
100 CAPSULE, LIQUID FILLED ORAL 2 TIMES DAILY
Status: DISCONTINUED | OUTPATIENT
Start: 2018-04-19 | End: 2018-04-20 | Stop reason: HOSPADM

## 2018-04-19 RX ORDER — KETOROLAC TROMETHAMINE 30 MG/ML
INJECTION, SOLUTION INTRAMUSCULAR; INTRAVENOUS PRN
Status: DISCONTINUED | OUTPATIENT
Start: 2018-04-19 | End: 2018-04-19 | Stop reason: SDUPTHER

## 2018-04-19 RX ORDER — GLYCOPYRROLATE 0.2 MG/ML
INJECTION INTRAMUSCULAR; INTRAVENOUS PRN
Status: DISCONTINUED | OUTPATIENT
Start: 2018-04-19 | End: 2018-04-19 | Stop reason: SDUPTHER

## 2018-04-19 RX ORDER — ACETAMINOPHEN 325 MG/1
650 TABLET ORAL EVERY 4 HOURS PRN
Status: DISCONTINUED | OUTPATIENT
Start: 2018-04-19 | End: 2018-04-20 | Stop reason: HOSPADM

## 2018-04-19 RX ORDER — IBUPROFEN 800 MG/1
800 TABLET ORAL EVERY 8 HOURS PRN
Status: DISCONTINUED | OUTPATIENT
Start: 2018-04-19 | End: 2018-04-20 | Stop reason: HOSPADM

## 2018-04-19 RX ORDER — HYDROCODONE BITARTRATE AND ACETAMINOPHEN 5; 325 MG/1; MG/1
2 TABLET ORAL PRN
Status: DISCONTINUED | OUTPATIENT
Start: 2018-04-19 | End: 2018-04-19 | Stop reason: HOSPADM

## 2018-04-19 RX ORDER — METOPROLOL TARTRATE 5 MG/5ML
INJECTION INTRAVENOUS PRN
Status: DISCONTINUED | OUTPATIENT
Start: 2018-04-19 | End: 2018-04-19 | Stop reason: SDUPTHER

## 2018-04-19 RX ORDER — MIDAZOLAM HYDROCHLORIDE 1 MG/ML
INJECTION INTRAMUSCULAR; INTRAVENOUS PRN
Status: DISCONTINUED | OUTPATIENT
Start: 2018-04-19 | End: 2018-04-19 | Stop reason: SDUPTHER

## 2018-04-19 RX ORDER — LIDOCAINE HYDROCHLORIDE 10 MG/ML
INJECTION, SOLUTION EPIDURAL; INFILTRATION; INTRACAUDAL; PERINEURAL PRN
Status: DISCONTINUED | OUTPATIENT
Start: 2018-04-19 | End: 2018-04-19 | Stop reason: SDUPTHER

## 2018-04-19 RX ORDER — METOCLOPRAMIDE HYDROCHLORIDE 5 MG/ML
10 INJECTION INTRAMUSCULAR; INTRAVENOUS
Status: DISCONTINUED | OUTPATIENT
Start: 2018-04-19 | End: 2018-04-19 | Stop reason: HOSPADM

## 2018-04-19 RX ORDER — HYDRALAZINE HYDROCHLORIDE 20 MG/ML
5 INJECTION INTRAMUSCULAR; INTRAVENOUS EVERY 10 MIN PRN
Status: DISCONTINUED | OUTPATIENT
Start: 2018-04-19 | End: 2018-04-19 | Stop reason: HOSPADM

## 2018-04-19 RX ADMIN — SODIUM CHLORIDE: 9 INJECTION, SOLUTION INTRAVENOUS at 14:42

## 2018-04-19 RX ADMIN — SODIUM CHLORIDE, POTASSIUM CHLORIDE, SODIUM LACTATE AND CALCIUM CHLORIDE: 600; 310; 30; 20 INJECTION, SOLUTION INTRAVENOUS at 09:20

## 2018-04-19 RX ADMIN — MORPHINE SULFATE 2 MG: 2 INJECTION, SOLUTION INTRAMUSCULAR; INTRAVENOUS at 12:22

## 2018-04-19 RX ADMIN — FENTANYL CITRATE 50 MCG: 50 INJECTION, SOLUTION INTRAMUSCULAR; INTRAVENOUS at 08:50

## 2018-04-19 RX ADMIN — FENTANYL CITRATE 50 MCG: 50 INJECTION, SOLUTION INTRAMUSCULAR; INTRAVENOUS at 08:35

## 2018-04-19 RX ADMIN — ROCURONIUM BROMIDE 50 MG: 10 INJECTION INTRAVENOUS at 08:25

## 2018-04-19 RX ADMIN — METOROPROLOL TARTRATE 2.5 MG: 5 INJECTION, SOLUTION INTRAVENOUS at 08:55

## 2018-04-19 RX ADMIN — IBUPROFEN 800 MG: 800 TABLET ORAL at 16:30

## 2018-04-19 RX ADMIN — KETOROLAC TROMETHAMINE 30 MG: 30 INJECTION, SOLUTION INTRAMUSCULAR at 10:01

## 2018-04-19 RX ADMIN — Medication 10 ML: at 23:40

## 2018-04-19 RX ADMIN — FENTANYL CITRATE 100 MCG: 50 INJECTION, SOLUTION INTRAMUSCULAR; INTRAVENOUS at 08:25

## 2018-04-19 RX ADMIN — LIDOCAINE HYDROCHLORIDE 50 MG: 10 INJECTION, SOLUTION EPIDURAL; INFILTRATION; INTRACAUDAL; PERINEURAL at 08:25

## 2018-04-19 RX ADMIN — GLYCOPYRROLATE 0.4 MG: 0.2 INJECTION, SOLUTION INTRAMUSCULAR; INTRAVENOUS at 10:08

## 2018-04-19 RX ADMIN — PROPOFOL 150 MG: 10 INJECTION, EMULSION INTRAVENOUS at 08:25

## 2018-04-19 RX ADMIN — HEPARIN SODIUM 5000 UNITS: 5000 INJECTION, SOLUTION INTRAVENOUS; SUBCUTANEOUS at 19:39

## 2018-04-19 RX ADMIN — ONDANSETRON 4 MG: 2 INJECTION INTRAMUSCULAR; INTRAVENOUS at 10:01

## 2018-04-19 RX ADMIN — NEOSTIGMINE METHYLSULFATE 3 MG: 1 INJECTION, SOLUTION INTRAVENOUS at 10:08

## 2018-04-19 RX ADMIN — SODIUM CHLORIDE, POTASSIUM CHLORIDE, SODIUM LACTATE AND CALCIUM CHLORIDE: 600; 310; 30; 20 INJECTION, SOLUTION INTRAVENOUS at 07:28

## 2018-04-19 RX ADMIN — Medication 2 G: at 22:55

## 2018-04-19 RX ADMIN — FENTANYL CITRATE 50 MCG: 50 INJECTION, SOLUTION INTRAMUSCULAR; INTRAVENOUS at 10:00

## 2018-04-19 RX ADMIN — FENTANYL CITRATE 25 MCG: 50 INJECTION INTRAMUSCULAR; INTRAVENOUS at 11:00

## 2018-04-19 RX ADMIN — DEXAMETHASONE SODIUM PHOSPHATE 8 MG: 4 INJECTION, SOLUTION INTRAMUSCULAR; INTRAVENOUS at 08:40

## 2018-04-19 RX ADMIN — Medication 2 G: at 08:18

## 2018-04-19 RX ADMIN — DOCUSATE SODIUM 100 MG: 100 CAPSULE, LIQUID FILLED ORAL at 19:41

## 2018-04-19 RX ADMIN — MIDAZOLAM 2 MG: 1 INJECTION INTRAMUSCULAR; INTRAVENOUS at 08:18

## 2018-04-19 RX ADMIN — Medication 2 G: at 14:47

## 2018-04-19 ASSESSMENT — PULMONARY FUNCTION TESTS
PIF_VALUE: 13
PIF_VALUE: 16
PIF_VALUE: 18
PIF_VALUE: 17
PIF_VALUE: 21
PIF_VALUE: 2
PIF_VALUE: 13
PIF_VALUE: 14
PIF_VALUE: 16
PIF_VALUE: 19
PIF_VALUE: 2
PIF_VALUE: 15
PIF_VALUE: 2
PIF_VALUE: 15
PIF_VALUE: 19
PIF_VALUE: 14
PIF_VALUE: 15
PIF_VALUE: 16
PIF_VALUE: 15
PIF_VALUE: 19
PIF_VALUE: 11
PIF_VALUE: 4
PIF_VALUE: 16
PIF_VALUE: 13
PIF_VALUE: 16
PIF_VALUE: 21
PIF_VALUE: 16
PIF_VALUE: 1
PIF_VALUE: 19
PIF_VALUE: 15
PIF_VALUE: 1
PIF_VALUE: 16
PIF_VALUE: 19
PIF_VALUE: 15
PIF_VALUE: 1
PIF_VALUE: 21
PIF_VALUE: 14
PIF_VALUE: 10
PIF_VALUE: 2
PIF_VALUE: 19
PIF_VALUE: 1
PIF_VALUE: 18
PIF_VALUE: 19
PIF_VALUE: 13
PIF_VALUE: 15
PIF_VALUE: 16
PIF_VALUE: 18
PIF_VALUE: 13
PIF_VALUE: 14
PIF_VALUE: 14
PIF_VALUE: 13
PIF_VALUE: 18
PIF_VALUE: 14
PIF_VALUE: 13
PIF_VALUE: 19
PIF_VALUE: 2
PIF_VALUE: 19
PIF_VALUE: 15
PIF_VALUE: 19
PIF_VALUE: 14
PIF_VALUE: 2
PIF_VALUE: 15
PIF_VALUE: 10
PIF_VALUE: 13
PIF_VALUE: 0
PIF_VALUE: 16
PIF_VALUE: 19
PIF_VALUE: 15
PIF_VALUE: 15
PIF_VALUE: 14
PIF_VALUE: 20
PIF_VALUE: 16
PIF_VALUE: 11
PIF_VALUE: 16
PIF_VALUE: 21
PIF_VALUE: 16
PIF_VALUE: 13
PIF_VALUE: 16
PIF_VALUE: 14
PIF_VALUE: 2
PIF_VALUE: 16
PIF_VALUE: 16
PIF_VALUE: 19
PIF_VALUE: 16
PIF_VALUE: 14
PIF_VALUE: 19
PIF_VALUE: 15
PIF_VALUE: 10
PIF_VALUE: 13
PIF_VALUE: 14
PIF_VALUE: 0
PIF_VALUE: 19
PIF_VALUE: 18
PIF_VALUE: 15
PIF_VALUE: 16
PIF_VALUE: 1
PIF_VALUE: 18
PIF_VALUE: 13
PIF_VALUE: 18
PIF_VALUE: 13
PIF_VALUE: 17
PIF_VALUE: 16
PIF_VALUE: 0
PIF_VALUE: 16
PIF_VALUE: 16
PIF_VALUE: 21
PIF_VALUE: 10
PIF_VALUE: 14
PIF_VALUE: 13
PIF_VALUE: 16
PIF_VALUE: 16
PIF_VALUE: 21
PIF_VALUE: 2
PIF_VALUE: 19
PIF_VALUE: 14
PIF_VALUE: 2
PIF_VALUE: 21
PIF_VALUE: 1
PIF_VALUE: 19
PIF_VALUE: 19
PIF_VALUE: 11

## 2018-04-19 ASSESSMENT — PAIN SCALES - GENERAL
PAINLEVEL_OUTOF10: 5
PAINLEVEL_OUTOF10: 0
PAINLEVEL_OUTOF10: 5
PAINLEVEL_OUTOF10: 2
PAINLEVEL_OUTOF10: 6

## 2018-04-19 ASSESSMENT — PAIN - FUNCTIONAL ASSESSMENT: PAIN_FUNCTIONAL_ASSESSMENT: 0-10

## 2018-04-19 ASSESSMENT — LIFESTYLE VARIABLES: SMOKING_STATUS: 1

## 2018-04-20 VITALS
TEMPERATURE: 97.5 F | HEIGHT: 65 IN | OXYGEN SATURATION: 97 % | HEART RATE: 67 BPM | WEIGHT: 121 LBS | SYSTOLIC BLOOD PRESSURE: 107 MMHG | RESPIRATION RATE: 16 BRPM | DIASTOLIC BLOOD PRESSURE: 59 MMHG | BODY MASS INDEX: 20.16 KG/M2

## 2018-04-20 LAB
ANION GAP SERPL CALCULATED.3IONS-SCNC: 9 MMOL/L (ref 9–17)
BUN BLDV-MCNC: 7 MG/DL (ref 6–20)
BUN/CREAT BLD: ABNORMAL (ref 9–20)
CALCIUM SERPL-MCNC: 8.1 MG/DL (ref 8.6–10.4)
CHLORIDE BLD-SCNC: 109 MMOL/L (ref 98–107)
CO2: 22 MMOL/L (ref 20–31)
CREAT SERPL-MCNC: 0.63 MG/DL (ref 0.5–0.9)
CULTURE: NORMAL
CULTURE: NORMAL
GFR AFRICAN AMERICAN: >60 ML/MIN
GFR NON-AFRICAN AMERICAN: >60 ML/MIN
GFR SERPL CREATININE-BSD FRML MDRD: ABNORMAL ML/MIN/{1.73_M2}
GFR SERPL CREATININE-BSD FRML MDRD: ABNORMAL ML/MIN/{1.73_M2}
GLUCOSE BLD-MCNC: 104 MG/DL (ref 70–99)
HCT VFR BLD CALC: 34.3 % (ref 36–46)
HEMOGLOBIN: 11.4 G/DL (ref 12–16)
Lab: NORMAL
POTASSIUM SERPL-SCNC: 4.1 MMOL/L (ref 3.7–5.3)
SODIUM BLD-SCNC: 140 MMOL/L (ref 135–144)
SPECIMEN DESCRIPTION: NORMAL
SPECIMEN DESCRIPTION: NORMAL
STATUS: NORMAL

## 2018-04-20 PROCEDURE — 80048 BASIC METABOLIC PNL TOTAL CA: CPT

## 2018-04-20 PROCEDURE — 36415 COLL VENOUS BLD VENIPUNCTURE: CPT

## 2018-04-20 PROCEDURE — 6370000000 HC RX 637 (ALT 250 FOR IP): Performed by: OBSTETRICS & GYNECOLOGY

## 2018-04-20 PROCEDURE — 85018 HEMOGLOBIN: CPT

## 2018-04-20 PROCEDURE — 6360000002 HC RX W HCPCS: Performed by: OBSTETRICS & GYNECOLOGY

## 2018-04-20 PROCEDURE — 85014 HEMATOCRIT: CPT

## 2018-04-20 RX ORDER — PSEUDOEPHEDRINE HCL 30 MG
100 TABLET ORAL 2 TIMES DAILY
Qty: 60 CAPSULE | Refills: 1 | Status: SHIPPED | OUTPATIENT
Start: 2018-04-20 | End: 2018-05-30 | Stop reason: CLARIF

## 2018-04-20 RX ORDER — OXYCODONE HYDROCHLORIDE AND ACETAMINOPHEN 5; 325 MG/1; MG/1
1 TABLET ORAL EVERY 4 HOURS PRN
Qty: 10 TABLET | Refills: 0 | Status: SHIPPED | OUTPATIENT
Start: 2018-04-20 | End: 2018-04-27

## 2018-04-20 RX ORDER — IBUPROFEN 800 MG/1
800 TABLET ORAL EVERY 8 HOURS PRN
Qty: 30 TABLET | Refills: 0 | Status: SHIPPED | OUTPATIENT
Start: 2018-04-20 | End: 2018-05-30 | Stop reason: CLARIF

## 2018-04-20 RX ADMIN — HEPARIN SODIUM 5000 UNITS: 5000 INJECTION, SOLUTION INTRAVENOUS; SUBCUTANEOUS at 07:31

## 2018-04-20 RX ADMIN — IBUPROFEN 800 MG: 800 TABLET ORAL at 03:13

## 2018-04-20 RX ADMIN — DOCUSATE SODIUM 100 MG: 100 CAPSULE, LIQUID FILLED ORAL at 07:31

## 2018-04-20 RX ADMIN — MAGNESIUM HYDROXIDE 30 ML: 400 SUSPENSION ORAL at 07:31

## 2018-04-20 ASSESSMENT — PAIN SCALES - GENERAL
PAINLEVEL_OUTOF10: 3
PAINLEVEL_OUTOF10: 6

## 2018-04-20 ASSESSMENT — PAIN DESCRIPTION - ORIENTATION: ORIENTATION: LOWER;MID

## 2018-04-20 ASSESSMENT — PAIN DESCRIPTION - DESCRIPTORS: DESCRIPTORS: ACHING

## 2018-04-20 ASSESSMENT — PAIN DESCRIPTION - PROGRESSION: CLINICAL_PROGRESSION: NOT CHANGED

## 2018-04-20 ASSESSMENT — PAIN DESCRIPTION - LOCATION: LOCATION: ABDOMEN

## 2018-04-20 ASSESSMENT — PAIN DESCRIPTION - PAIN TYPE: TYPE: SURGICAL PAIN

## 2018-04-20 ASSESSMENT — PAIN DESCRIPTION - FREQUENCY: FREQUENCY: CONTINUOUS

## 2018-04-23 LAB — SURGICAL PATHOLOGY REPORT: NORMAL

## 2018-04-26 ENCOUNTER — OFFICE VISIT (OUTPATIENT)
Dept: OBGYN CLINIC | Age: 43
End: 2018-04-26

## 2018-04-26 VITALS
DIASTOLIC BLOOD PRESSURE: 98 MMHG | HEART RATE: 90 BPM | TEMPERATURE: 98.4 F | WEIGHT: 116.4 LBS | BODY MASS INDEX: 18.71 KG/M2 | SYSTOLIC BLOOD PRESSURE: 133 MMHG | HEIGHT: 66 IN

## 2018-04-26 DIAGNOSIS — Z48.89 POSTOPERATIVE VISIT: Primary | ICD-10-CM

## 2018-04-26 PROCEDURE — 99024 POSTOP FOLLOW-UP VISIT: CPT | Performed by: SPECIALIST

## 2018-05-05 ASSESSMENT — ENCOUNTER SYMPTOMS
CONSTIPATION: 0
ABDOMINAL PAIN: 0
COUGH: 0
VOMITING: 0
APNEA: 0
DIARRHEA: 0
EYE PAIN: 0
NAUSEA: 0
ABDOMINAL DISTENTION: 0

## 2018-05-16 ENCOUNTER — OFFICE VISIT (OUTPATIENT)
Dept: OBGYN CLINIC | Age: 43
End: 2018-05-16

## 2018-05-16 VITALS
DIASTOLIC BLOOD PRESSURE: 86 MMHG | HEART RATE: 80 BPM | HEIGHT: 65 IN | WEIGHT: 116.6 LBS | BODY MASS INDEX: 19.43 KG/M2 | SYSTOLIC BLOOD PRESSURE: 129 MMHG

## 2018-05-16 DIAGNOSIS — Z48.89 POSTOPERATIVE VISIT: Primary | ICD-10-CM

## 2018-05-16 PROCEDURE — 99024 POSTOP FOLLOW-UP VISIT: CPT | Performed by: SPECIALIST

## 2018-05-16 RX ORDER — METRONIDAZOLE 500 MG/1
500 TABLET ORAL 2 TIMES DAILY
Qty: 14 TABLET | Refills: 0 | Status: SHIPPED | OUTPATIENT
Start: 2018-05-16 | End: 2018-05-23

## 2018-05-16 RX ORDER — FLUCONAZOLE 100 MG/1
100 TABLET ORAL DAILY
Qty: 7 TABLET | Refills: 0 | Status: SHIPPED | OUTPATIENT
Start: 2018-05-16 | End: 2018-05-23

## 2018-05-16 ASSESSMENT — ENCOUNTER SYMPTOMS
APNEA: 0
NAUSEA: 0
EYE PAIN: 0
COUGH: 0
ABDOMINAL PAIN: 0
DIARRHEA: 0
ABDOMINAL DISTENTION: 0
CONSTIPATION: 0
VOMITING: 0

## 2018-05-24 ENCOUNTER — TELEPHONE (OUTPATIENT)
Dept: OBGYN CLINIC | Age: 43
End: 2018-05-24

## 2018-05-30 ENCOUNTER — OFFICE VISIT (OUTPATIENT)
Dept: OBGYN CLINIC | Age: 43
End: 2018-05-30

## 2018-05-30 VITALS
WEIGHT: 113 LBS | RESPIRATION RATE: 18 BRPM | BODY MASS INDEX: 18.83 KG/M2 | DIASTOLIC BLOOD PRESSURE: 86 MMHG | SYSTOLIC BLOOD PRESSURE: 120 MMHG | HEART RATE: 98 BPM | HEIGHT: 65 IN

## 2018-05-30 DIAGNOSIS — Z48.89 POSTOPERATIVE VISIT: Primary | ICD-10-CM

## 2018-05-30 PROCEDURE — 99024 POSTOP FOLLOW-UP VISIT: CPT | Performed by: SPECIALIST

## 2018-05-30 ASSESSMENT — ENCOUNTER SYMPTOMS
ABDOMINAL DISTENTION: 0
EYE PAIN: 0
COUGH: 0
VOMITING: 0
ABDOMINAL PAIN: 0
DIARRHEA: 0
CONSTIPATION: 0
APNEA: 0
NAUSEA: 0

## 2018-12-04 ENCOUNTER — APPOINTMENT (OUTPATIENT)
Dept: GENERAL RADIOLOGY | Age: 43
End: 2018-12-04
Payer: COMMERCIAL

## 2018-12-04 ENCOUNTER — HOSPITAL ENCOUNTER (EMERGENCY)
Age: 43
Discharge: HOME | End: 2018-12-04
Attending: EMERGENCY MEDICINE
Payer: COMMERCIAL

## 2018-12-04 VITALS
OXYGEN SATURATION: 97 % | RESPIRATION RATE: 20 BRPM | TEMPERATURE: 98.2 F | HEART RATE: 89 BPM | DIASTOLIC BLOOD PRESSURE: 99 MMHG | SYSTOLIC BLOOD PRESSURE: 124 MMHG

## 2018-12-04 DIAGNOSIS — R07.9 CHEST PAIN, UNSPECIFIED TYPE: Primary | ICD-10-CM

## 2018-12-04 LAB
ALBUMIN SERPL-MCNC: 4.4 G/DL (ref 3.5–5.2)
ALBUMIN/GLOBULIN RATIO: ABNORMAL (ref 1–2.5)
ALP BLD-CCNC: 77 U/L (ref 35–104)
ALT SERPL-CCNC: 12 U/L (ref 5–33)
ANION GAP SERPL CALCULATED.3IONS-SCNC: 16 MMOL/L (ref 9–17)
AST SERPL-CCNC: 19 U/L
BILIRUB SERPL-MCNC: <0.15 MG/DL (ref 0.3–1.2)
BUN BLDV-MCNC: 8 MG/DL (ref 6–20)
BUN/CREAT BLD: ABNORMAL (ref 9–20)
CALCIUM SERPL-MCNC: 9.1 MG/DL (ref 8.6–10.4)
CHLORIDE BLD-SCNC: 101 MMOL/L (ref 98–107)
CO2: 20 MMOL/L (ref 20–31)
CREAT SERPL-MCNC: 0.79 MG/DL (ref 0.5–0.9)
D-DIMER QUANTITATIVE: 0.34 MG/L FEU
GFR AFRICAN AMERICAN: >60 ML/MIN
GFR NON-AFRICAN AMERICAN: >60 ML/MIN
GFR SERPL CREATININE-BSD FRML MDRD: ABNORMAL ML/MIN/{1.73_M2}
GFR SERPL CREATININE-BSD FRML MDRD: ABNORMAL ML/MIN/{1.73_M2}
GLUCOSE BLD-MCNC: 120 MG/DL (ref 70–99)
HCT VFR BLD CALC: 43.8 % (ref 36–46)
HEMOGLOBIN: 14.5 G/DL (ref 12–16)
MCH RBC QN AUTO: 29.4 PG (ref 26–34)
MCHC RBC AUTO-ENTMCNC: 33.1 G/DL (ref 31–37)
MCV RBC AUTO: 88.8 FL (ref 80–100)
NRBC AUTOMATED: ABNORMAL PER 100 WBC
PDW BLD-RTO: 14.9 % (ref 11.5–14.9)
PLATELET # BLD: 321 K/UL (ref 150–450)
PMV BLD AUTO: 8.8 FL (ref 6–12)
POTASSIUM SERPL-SCNC: 3.9 MMOL/L (ref 3.7–5.3)
RBC # BLD: 4.93 M/UL (ref 4–5.2)
SODIUM BLD-SCNC: 137 MMOL/L (ref 135–144)
TOTAL PROTEIN: 8.2 G/DL (ref 6.4–8.3)
TROPONIN INTERP: NORMAL
TROPONIN INTERP: NORMAL
TROPONIN T: <0.03 NG/ML
TROPONIN T: <0.03 NG/ML
WBC # BLD: 13.7 K/UL (ref 3.5–11)

## 2018-12-04 PROCEDURE — 80053 COMPREHEN METABOLIC PANEL: CPT

## 2018-12-04 PROCEDURE — 85027 COMPLETE CBC AUTOMATED: CPT

## 2018-12-04 PROCEDURE — 84484 ASSAY OF TROPONIN QUANT: CPT

## 2018-12-04 PROCEDURE — 71045 X-RAY EXAM CHEST 1 VIEW: CPT

## 2018-12-04 PROCEDURE — 93005 ELECTROCARDIOGRAM TRACING: CPT

## 2018-12-04 PROCEDURE — 6360000002 HC RX W HCPCS: Performed by: EMERGENCY MEDICINE

## 2018-12-04 PROCEDURE — 36415 COLL VENOUS BLD VENIPUNCTURE: CPT

## 2018-12-04 PROCEDURE — 85379 FIBRIN DEGRADATION QUANT: CPT

## 2018-12-04 PROCEDURE — 99285 EMERGENCY DEPT VISIT HI MDM: CPT

## 2018-12-04 RX ORDER — ASPIRIN 81 MG/1
324 TABLET, CHEWABLE ORAL ONCE
Status: DISCONTINUED | OUTPATIENT
Start: 2018-12-04 | End: 2018-12-05 | Stop reason: HOSPADM

## 2018-12-04 RX ORDER — NITROGLYCERIN 0.4 MG/1
0.4 TABLET SUBLINGUAL EVERY 5 MIN PRN
Status: DISCONTINUED | OUTPATIENT
Start: 2018-12-04 | End: 2018-12-05 | Stop reason: HOSPADM

## 2018-12-04 RX ORDER — LORAZEPAM 2 MG/ML
1 INJECTION INTRAMUSCULAR ONCE
Status: COMPLETED | OUTPATIENT
Start: 2018-12-04 | End: 2018-12-04

## 2018-12-04 RX ADMIN — LORAZEPAM 1 MG: 2 INJECTION INTRAMUSCULAR; INTRAVENOUS at 17:46

## 2018-12-04 ASSESSMENT — ENCOUNTER SYMPTOMS
SHORTNESS OF BREATH: 1
BACK PAIN: 0
ABDOMINAL PAIN: 0

## 2018-12-04 ASSESSMENT — HEART SCORE
ECG: 1
ECG: 1

## 2018-12-04 ASSESSMENT — PAIN SCALES - GENERAL: PAINLEVEL_OUTOF10: 10

## 2018-12-04 NOTE — ED PROVIDER NOTES
PANEL W/ REFLEX TO MG FOR LOW K - Abnormal; Notable for the following:     Glucose 120 (*)     Total Bilirubin <0.15 (*)     All other components within normal limits   D-DIMER, QUANTITATIVE   TROPONIN   TROPONIN     EMERGENCY DEPARTMENTCOURSE:   Vitals:    Vitals:    12/04/18 1815 12/04/18 1830 12/04/18 1845 12/04/18 2220   BP: 133/84 127/83 (!) 124/99    Pulse: 90 84 89    Resp: 23 25 20    Temp:    98.2 °F (36.8 °C)   TempSrc:    Oral   SpO2: 98% 98% 97%        The patient was given the following medications while in the emergency department:  Orders Placed This Encounter   Medications    nitroGLYCERIN (NITROSTAT) SL tablet 0.4 mg    aspirin chewable tablet 324 mg    LORazepam (ATIVAN) injection 1 mg     CONSULTS:  None    FINAL IMPRESSION      1. Chest pain, unspecified type          DISPOSITION/PLAN   DISPOSITION Decision To Discharge 12/04/2018 10:11:01 PM      PATIENT REFERRED TO:  Leonila Floyd  [address] [place] [phone]      If symptoms worsen    DISCHARGE MEDICATIONS:  There are no discharge medications for this patient. Marty Blackmon MD  Attending Emergency Physician  [employer] voice recognition software used in portions of this document.                     Marty Blackmon MD  12/04/18 3261

## 2018-12-06 LAB
EKG ATRIAL RATE: 103 BPM
EKG P AXIS: 77 DEGREES
EKG P-R INTERVAL: 130 MS
EKG Q-T INTERVAL: 358 MS
EKG QRS DURATION: 80 MS
EKG QTC CALCULATION (BAZETT): 468 MS
EKG R AXIS: 73 DEGREES
EKG T AXIS: 45 DEGREES
EKG VENTRICULAR RATE: 103 BPM

## 2019-03-19 ENCOUNTER — HOSPITAL ENCOUNTER (EMERGENCY)
Age: 44
Discharge: LEFT BEFORE DISCHARGE | End: 2019-03-19
Attending: EMERGENCY MEDICINE
Payer: COMMERCIAL

## 2019-03-19 ENCOUNTER — APPOINTMENT (OUTPATIENT)
Dept: GENERAL RADIOLOGY | Age: 44
End: 2019-03-19
Payer: COMMERCIAL

## 2019-03-19 ENCOUNTER — APPOINTMENT (OUTPATIENT)
Dept: CT IMAGING | Age: 44
End: 2019-03-19
Payer: COMMERCIAL

## 2019-03-19 VITALS
RESPIRATION RATE: 16 BRPM | HEIGHT: 65 IN | DIASTOLIC BLOOD PRESSURE: 93 MMHG | TEMPERATURE: 97.7 F | HEART RATE: 92 BPM | WEIGHT: 113 LBS | SYSTOLIC BLOOD PRESSURE: 145 MMHG | OXYGEN SATURATION: 100 % | BODY MASS INDEX: 18.83 KG/M2

## 2019-03-19 DIAGNOSIS — R10.31 RIGHT LOWER QUADRANT ABDOMINAL PAIN: Primary | ICD-10-CM

## 2019-03-19 DIAGNOSIS — R11.11 NON-INTRACTABLE VOMITING WITHOUT NAUSEA, UNSPECIFIED VOMITING TYPE: ICD-10-CM

## 2019-03-19 DIAGNOSIS — Z76.89 ENCOUNTER TO ESTABLISH CARE WITH NEW DOCTOR: ICD-10-CM

## 2019-03-19 LAB
ABSOLUTE EOS #: 0.1 K/UL (ref 0–0.4)
ABSOLUTE IMMATURE GRANULOCYTE: ABNORMAL K/UL (ref 0–0.3)
ABSOLUTE LYMPH #: 3 K/UL (ref 1–4.8)
ABSOLUTE MONO #: 0.8 K/UL (ref 0.1–1.3)
ALBUMIN SERPL-MCNC: 4.1 G/DL (ref 3.5–5.2)
ALBUMIN/GLOBULIN RATIO: ABNORMAL (ref 1–2.5)
ALP BLD-CCNC: 73 U/L (ref 35–104)
ALT SERPL-CCNC: 15 U/L (ref 5–33)
ANION GAP SERPL CALCULATED.3IONS-SCNC: 12 MMOL/L (ref 9–17)
AST SERPL-CCNC: 21 U/L
BASOPHILS # BLD: 1 % (ref 0–2)
BASOPHILS ABSOLUTE: 0.1 K/UL (ref 0–0.2)
BILIRUB SERPL-MCNC: 0.17 MG/DL (ref 0.3–1.2)
BILIRUBIN URINE: NEGATIVE
BUN BLDV-MCNC: 10 MG/DL (ref 6–20)
BUN/CREAT BLD: ABNORMAL (ref 9–20)
CALCIUM SERPL-MCNC: 9.1 MG/DL (ref 8.6–10.4)
CHLORIDE BLD-SCNC: 102 MMOL/L (ref 98–107)
CO2: 25 MMOL/L (ref 20–31)
COLOR: YELLOW
COMMENT UA: NORMAL
CREAT SERPL-MCNC: 0.7 MG/DL (ref 0.5–0.9)
DIFFERENTIAL TYPE: ABNORMAL
EOSINOPHILS RELATIVE PERCENT: 1 % (ref 0–4)
GFR AFRICAN AMERICAN: >60 ML/MIN
GFR NON-AFRICAN AMERICAN: >60 ML/MIN
GFR SERPL CREATININE-BSD FRML MDRD: ABNORMAL ML/MIN/{1.73_M2}
GFR SERPL CREATININE-BSD FRML MDRD: ABNORMAL ML/MIN/{1.73_M2}
GLUCOSE BLD-MCNC: 93 MG/DL (ref 70–99)
GLUCOSE URINE: NEGATIVE
HCG QUALITATIVE: NEGATIVE
HCT VFR BLD CALC: 41.8 % (ref 36–46)
HEMOGLOBIN: 14 G/DL (ref 12–16)
IMMATURE GRANULOCYTES: ABNORMAL %
KETONES, URINE: NEGATIVE
LEUKOCYTE ESTERASE, URINE: NEGATIVE
LYMPHOCYTES # BLD: 32 % (ref 24–44)
MCH RBC QN AUTO: 30.3 PG (ref 26–34)
MCHC RBC AUTO-ENTMCNC: 33.6 G/DL (ref 31–37)
MCV RBC AUTO: 90.2 FL (ref 80–100)
MONOCYTES # BLD: 9 % (ref 1–7)
NITRITE, URINE: NEGATIVE
NRBC AUTOMATED: ABNORMAL PER 100 WBC
PDW BLD-RTO: 13.6 % (ref 11.5–14.9)
PH UA: 6.5 (ref 5–8)
PLATELET # BLD: 275 K/UL (ref 150–450)
PLATELET ESTIMATE: ABNORMAL
PMV BLD AUTO: 8.8 FL (ref 6–12)
POTASSIUM SERPL-SCNC: 4 MMOL/L (ref 3.7–5.3)
PROTEIN UA: NEGATIVE
RBC # BLD: 4.64 M/UL (ref 4–5.2)
RBC # BLD: ABNORMAL 10*6/UL
SEG NEUTROPHILS: 57 % (ref 36–66)
SEGMENTED NEUTROPHILS ABSOLUTE COUNT: 5.3 K/UL (ref 1.3–9.1)
SODIUM BLD-SCNC: 139 MMOL/L (ref 135–144)
SPECIFIC GRAVITY UA: 1.01 (ref 1–1.03)
TOTAL PROTEIN: 7.3 G/DL (ref 6.4–8.3)
TURBIDITY: CLEAR
URINE HGB: NEGATIVE
UROBILINOGEN, URINE: NORMAL
WBC # BLD: 9.3 K/UL (ref 3.5–11)
WBC # BLD: ABNORMAL 10*3/UL

## 2019-03-19 PROCEDURE — 96374 THER/PROPH/DIAG INJ IV PUSH: CPT

## 2019-03-19 PROCEDURE — 87086 URINE CULTURE/COLONY COUNT: CPT

## 2019-03-19 PROCEDURE — 74022 RADEX COMPL AQT ABD SERIES: CPT

## 2019-03-19 PROCEDURE — 85025 COMPLETE CBC W/AUTO DIFF WBC: CPT

## 2019-03-19 PROCEDURE — 36415 COLL VENOUS BLD VENIPUNCTURE: CPT

## 2019-03-19 PROCEDURE — 2580000003 HC RX 258: Performed by: STUDENT IN AN ORGANIZED HEALTH CARE EDUCATION/TRAINING PROGRAM

## 2019-03-19 PROCEDURE — 80053 COMPREHEN METABOLIC PANEL: CPT

## 2019-03-19 PROCEDURE — 81003 URINALYSIS AUTO W/O SCOPE: CPT

## 2019-03-19 PROCEDURE — 6360000002 HC RX W HCPCS: Performed by: STUDENT IN AN ORGANIZED HEALTH CARE EDUCATION/TRAINING PROGRAM

## 2019-03-19 PROCEDURE — 84703 CHORIONIC GONADOTROPIN ASSAY: CPT

## 2019-03-19 PROCEDURE — 96375 TX/PRO/DX INJ NEW DRUG ADDON: CPT

## 2019-03-19 PROCEDURE — 99284 EMERGENCY DEPT VISIT MOD MDM: CPT

## 2019-03-19 RX ORDER — 0.9 % SODIUM CHLORIDE 0.9 %
1000 INTRAVENOUS SOLUTION INTRAVENOUS ONCE
Status: COMPLETED | OUTPATIENT
Start: 2019-03-19 | End: 2019-03-19

## 2019-03-19 RX ORDER — ONDANSETRON 2 MG/ML
4 INJECTION INTRAMUSCULAR; INTRAVENOUS ONCE
Status: COMPLETED | OUTPATIENT
Start: 2019-03-19 | End: 2019-03-19

## 2019-03-19 RX ORDER — KETOROLAC TROMETHAMINE 30 MG/ML
30 INJECTION, SOLUTION INTRAMUSCULAR; INTRAVENOUS ONCE
Status: COMPLETED | OUTPATIENT
Start: 2019-03-19 | End: 2019-03-19

## 2019-03-19 RX ADMIN — KETOROLAC TROMETHAMINE 30 MG: 30 INJECTION, SOLUTION INTRAMUSCULAR at 17:50

## 2019-03-19 RX ADMIN — SODIUM CHLORIDE 1000 ML: 9 INJECTION, SOLUTION INTRAVENOUS at 17:47

## 2019-03-19 RX ADMIN — ONDANSETRON 4 MG: 2 INJECTION INTRAMUSCULAR; INTRAVENOUS at 17:48

## 2019-03-19 ASSESSMENT — PAIN DESCRIPTION - ORIENTATION: ORIENTATION: RIGHT;LOWER

## 2019-03-19 ASSESSMENT — PAIN DESCRIPTION - LOCATION: LOCATION: ABDOMEN

## 2019-03-19 ASSESSMENT — PAIN SCALES - GENERAL
PAINLEVEL_OUTOF10: 9
PAINLEVEL_OUTOF10: 9

## 2019-03-19 ASSESSMENT — PAIN DESCRIPTION - FREQUENCY: FREQUENCY: CONTINUOUS

## 2019-03-19 ASSESSMENT — PAIN DESCRIPTION - DESCRIPTORS: DESCRIPTORS: STABBING

## 2019-03-20 LAB
CULTURE: NORMAL
Lab: NORMAL
SPECIMEN DESCRIPTION: NORMAL

## 2019-03-21 ENCOUNTER — OFFICE VISIT (OUTPATIENT)
Dept: INTERNAL MEDICINE CLINIC | Age: 44
End: 2019-03-21
Payer: COMMERCIAL

## 2019-03-21 VITALS
RESPIRATION RATE: 16 BRPM | SYSTOLIC BLOOD PRESSURE: 116 MMHG | WEIGHT: 115 LBS | HEIGHT: 65 IN | DIASTOLIC BLOOD PRESSURE: 70 MMHG | BODY MASS INDEX: 19.16 KG/M2 | HEART RATE: 81 BPM | OXYGEN SATURATION: 98 %

## 2019-03-21 DIAGNOSIS — R10.31 RLQ ABDOMINAL PAIN: ICD-10-CM

## 2019-03-21 DIAGNOSIS — F17.200 CURRENT EVERY DAY SMOKER: ICD-10-CM

## 2019-03-21 DIAGNOSIS — Z00.00 PREVENTATIVE HEALTH CARE: ICD-10-CM

## 2019-03-21 DIAGNOSIS — Z76.89 ENCOUNTER TO ESTABLISH CARE: Primary | ICD-10-CM

## 2019-03-21 PROCEDURE — 1111F DSCHRG MED/CURRENT MED MERGE: CPT | Performed by: NURSE PRACTITIONER

## 2019-03-21 PROCEDURE — G8484 FLU IMMUNIZE NO ADMIN: HCPCS | Performed by: NURSE PRACTITIONER

## 2019-03-21 PROCEDURE — G8427 DOCREV CUR MEDS BY ELIG CLIN: HCPCS | Performed by: NURSE PRACTITIONER

## 2019-03-21 PROCEDURE — G8420 CALC BMI NORM PARAMETERS: HCPCS | Performed by: NURSE PRACTITIONER

## 2019-03-21 PROCEDURE — 99204 OFFICE O/P NEW MOD 45 MIN: CPT | Performed by: NURSE PRACTITIONER

## 2019-03-21 PROCEDURE — 4004F PT TOBACCO SCREEN RCVD TLK: CPT | Performed by: NURSE PRACTITIONER

## 2019-03-21 RX ORDER — NICOTINE 21 MG/24HR
1 PATCH, TRANSDERMAL 24 HOURS TRANSDERMAL DAILY
Qty: 30 PATCH | Refills: 1 | Status: SHIPPED | OUTPATIENT
Start: 2019-03-21 | End: 2019-07-09

## 2019-03-21 ASSESSMENT — PATIENT HEALTH QUESTIONNAIRE - PHQ9
SUM OF ALL RESPONSES TO PHQ QUESTIONS 1-9: 0
1. LITTLE INTEREST OR PLEASURE IN DOING THINGS: 0
SUM OF ALL RESPONSES TO PHQ9 QUESTIONS 1 & 2: 0
2. FEELING DOWN, DEPRESSED OR HOPELESS: 0
SUM OF ALL RESPONSES TO PHQ QUESTIONS 1-9: 0

## 2019-03-21 ASSESSMENT — ENCOUNTER SYMPTOMS
VOMITING: 0
EYE DISCHARGE: 1
RHINORRHEA: 0
ABDOMINAL PAIN: 1
ABDOMINAL DISTENTION: 1
DIARRHEA: 0
BACK PAIN: 0
NAUSEA: 0
CONSTIPATION: 0
COUGH: 0
SHORTNESS OF BREATH: 0
WHEEZING: 0

## 2019-03-22 ASSESSMENT — ENCOUNTER SYMPTOMS: COLOR CHANGE: 0

## 2019-03-25 ENCOUNTER — TELEPHONE (OUTPATIENT)
Dept: INTERNAL MEDICINE CLINIC | Age: 44
End: 2019-03-25

## 2019-03-25 DIAGNOSIS — R10.31 RLQ ABDOMINAL PAIN: Primary | ICD-10-CM

## 2019-07-09 ENCOUNTER — OFFICE VISIT (OUTPATIENT)
Dept: INTERNAL MEDICINE CLINIC | Age: 44
End: 2019-07-09
Payer: COMMERCIAL

## 2019-07-09 VITALS
SYSTOLIC BLOOD PRESSURE: 122 MMHG | WEIGHT: 113 LBS | DIASTOLIC BLOOD PRESSURE: 70 MMHG | BODY MASS INDEX: 18.83 KG/M2 | HEIGHT: 65 IN | OXYGEN SATURATION: 96 % | HEART RATE: 88 BPM | TEMPERATURE: 98.1 F

## 2019-07-09 DIAGNOSIS — Z02.89 ENCOUNTER FOR PHYSICAL EXAMINATION RELATED TO EMPLOYMENT: Primary | ICD-10-CM

## 2019-07-09 DIAGNOSIS — M54.50 CHRONIC BILATERAL LOW BACK PAIN WITHOUT SCIATICA: ICD-10-CM

## 2019-07-09 DIAGNOSIS — G89.29 CHRONIC BILATERAL LOW BACK PAIN WITHOUT SCIATICA: ICD-10-CM

## 2019-07-09 DIAGNOSIS — F17.200 CURRENT EVERY DAY SMOKER: ICD-10-CM

## 2019-07-09 PROCEDURE — 99396 PREV VISIT EST AGE 40-64: CPT | Performed by: NURSE PRACTITIONER

## 2019-07-09 ASSESSMENT — ENCOUNTER SYMPTOMS
COLOR CHANGE: 0
TROUBLE SWALLOWING: 0
CONSTIPATION: 0
DIARRHEA: 0
SHORTNESS OF BREATH: 0
RHINORRHEA: 0
WHEEZING: 0
ABDOMINAL PAIN: 0
EYE DISCHARGE: 0
COUGH: 0
BACK PAIN: 1

## 2019-07-09 NOTE — PATIENT INSTRUCTIONS
for each leg. Hip flexor stretch    1. Kneel on the floor with one knee bent and one leg behind you. Place your forward knee over your foot. Keep your other knee touching the floor. 2. Slowly push your hips forward until you feel a stretch in the upper thigh of your rear leg. 3. Hold the stretch for at least 15 to 30 seconds. Repeat with your other leg. 4. Do 2 to 4 times on each side. Wall sit    1. Stand with your back 10 to 12 inches away from a wall. 2. Lean into the wall until your back is flat against it. 3. Slowly slide down until your knees are slightly bent, pressing your lower back into the wall. 4. Hold for about 6 seconds, then slide back up the wall. 5. Repeat 8 to 12 times. Follow-up care is a key part of your treatment and safety. Be sure to make and go to all appointments, and call your doctor if you are having problems. It's also a good idea to know your test results and keep a list of the medicines you take. Where can you learn more? Go to https://[employer]. org and sign in to your [employer] account. Enter P809 in the [employer] box to learn more about \"Low Back Pain: Exercises. \"     If you do not have an account, please click on the \"Sign Up Now\" link. Current as of: September 20, 2018  Content Version: 12.0  © 8406-7364 Healthwise, Incorporated. Care instructions adapted under license by [facility] ([facility]). If you have questions about a medical condition or this instruction, always ask your healthcare professional. [place] [address] any warranty or liability for your use of this information.

## 2019-07-09 NOTE — PROGRESS NOTES
patient. Vin Collazo is a 39 y.o. is here for an employment physical. She denies any current problems, was recently treated for UTI and completed 7 day course of macrobid except for 1 pill because it upset her stomach. She has chronic intermittent low back pain from previous work injury in 2016, states it flares up for a few days and then goes away. She is still smoking daily but has cut back with gum. She has not yet gotten labs previously ordered. She does not have a physical form from her employer. Patient Active Problem List   Diagnosis    Submucous uterine fibroid    Pelvic pain in female    History of tubal ligation    4/19/18 s/p LAVH with LS    Hx of Ectopic pregnancy and RS       Allergies   Allergen Reactions    Bactrim [Sulfamethoxazole-Trimethoprim]     Succinylcholine          MEDICATIONS:     Current Outpatient Medications   Medication Sig Dispense Refill    nicotine polacrilex (NICORETTE) 2 MG gum Take 1 each by mouth as needed for Smoking cessation 110 each 3     No current facility-administered medications for this visit. SOCIAL HISTORY    Reviewed and updated. Nicole Rader  reports that she has been smoking cigarettes. She started smoking about 29 years ago. She has a 5.00 pack-year smoking history. She has never used smokeless tobacco.    FAMILY HISTORY:    Reviewed and updated. family history includes Cancer in her father; Diabetes in her mother; Heart Attack in her father; High Blood Pressure in her mother. REVIEW OF SYSTEMS:      Review of Systems   Constitutional: Negative for chills, fatigue and fever. HENT: Negative for congestion, ear pain, rhinorrhea and trouble swallowing. Eyes: Negative for discharge and visual disturbance. Respiratory: Negative for cough, shortness of breath and wheezing. Cardiovascular: Negative for chest pain, palpitations and leg swelling. Gastrointestinal: Negative for abdominal pain (improved), constipation and diarrhea.    Genitourinary: Negative for dysuria and frequency. No dysuria at present, completed course of macrobid   Musculoskeletal: Positive for back pain. Negative for gait problem. Skin: Negative for color change and rash. Neurological: Negative for dizziness, numbness and headaches. Psychiatric/Behavioral: Negative for behavioral problems. The patient is not nervous/anxious. PHYSICAL EXAM:      Vitals:    07/09/19 1548   BP: 122/70   Pulse: 88   Temp: 98.1 °F (36.7 °C)   TempSrc: Oral   SpO2: 96%   Weight: 113 lb (51.3 kg)   Height: 5' 5\" (1.651 m)     BP Readings from Last 3 Encounters:   07/09/19 122/70   03/21/19 116/70   03/19/19 (!) 145/93        Physical Exam   Constitutional: She is oriented to person, place, and time. She appears well-developed and well-nourished. No distress. HENT:   Head: Normocephalic and atraumatic. Right Ear: External ear normal.   Left Ear: External ear normal.   Nose: Nose normal.   Mouth/Throat: Oropharynx is clear and moist.   Eyes: Pupils are equal, round, and reactive to light. Conjunctivae are normal. Right eye exhibits no discharge. Left eye exhibits no discharge. Neck: Normal range of motion. Neck supple. Cardiovascular: Normal rate, regular rhythm and normal heart sounds. No murmur heard. Pulmonary/Chest: Effort normal and breath sounds normal. She has no wheezes. Abdominal: Soft. Bowel sounds are normal. There is no tenderness. Musculoskeletal:        Right knee: She exhibits normal range of motion. No tenderness found. Left knee: She exhibits normal range of motion. No tenderness found. Cervical back: She exhibits normal range of motion, no tenderness and no swelling. Thoracic back: She exhibits normal range of motion, no tenderness and no swelling. Lumbar back: She exhibits tenderness. She exhibits normal range of motion and no swelling. Lymphadenopathy:     She has no cervical adenopathy.    Neurological: She is alert and oriented

## 2019-07-12 ENCOUNTER — NURSE ONLY (OUTPATIENT)
Dept: INTERNAL MEDICINE CLINIC | Age: 44
End: 2019-07-12

## 2019-07-12 DIAGNOSIS — Z11.1 ENCOUNTER FOR PPD SKIN TEST READING: Primary | ICD-10-CM

## 2020-03-30 ENCOUNTER — HOSPITAL ENCOUNTER (EMERGENCY)
Age: 45
Discharge: HOME | End: 2020-03-30
Attending: EMERGENCY MEDICINE

## 2020-03-30 VITALS
HEART RATE: 94 BPM | DIASTOLIC BLOOD PRESSURE: 96 MMHG | SYSTOLIC BLOOD PRESSURE: 147 MMHG | TEMPERATURE: 98.1 F | OXYGEN SATURATION: 100 % | RESPIRATION RATE: 16 BRPM

## 2020-03-30 PROCEDURE — 99284 EMERGENCY DEPT VISIT MOD MDM: CPT

## 2020-03-30 RX ORDER — LORATADINE 10 MG/1
10 TABLET ORAL DAILY
Qty: 30 TABLET | Refills: 0 | Status: SHIPPED | OUTPATIENT
Start: 2020-03-30

## 2020-03-30 RX ORDER — ALBUTEROL SULFATE 90 UG/1
2 AEROSOL, METERED RESPIRATORY (INHALATION) 4 TIMES DAILY PRN
Qty: 3 INHALER | Refills: 1 | Status: SHIPPED | OUTPATIENT
Start: 2020-03-30

## 2020-03-30 RX ORDER — PREDNISONE 50 MG/1
50 TABLET ORAL DAILY
Qty: 5 TABLET | Refills: 0 | Status: SHIPPED | OUTPATIENT
Start: 2020-03-30 | End: 2022-02-10 | Stop reason: ALTCHOICE

## 2020-03-31 NOTE — ED PROVIDER NOTES
[address] ED  eMERGENCY dEPARTMENT eNCOUnter      Pt Name: Hao Rodríguez  MRN: [id number]  [place] 1975  Date of evaluation: 3/30/2020  Provider: BARBARA Quezada    CHIEF COMPLAINT       Chief Complaint   Patient presents with    Pharyngitis    Chest Pain     tightness           HISTORY OF PRESENT ILLNESS  (Location/Symptom, Timing/Onset, Context/Setting, Quality, Duration, Modifying Factors, Severity.)   Hao Rodríguez is a 38 y.o. female who presents to the emergency department with very mild cough for the past few days. States feels \"congestion. \"  Denies any shortness of breath, chest pain, fevers, chills, abdominal pain, nausea, vomiting. Denies trouble swallowing or breathing. Tobacco use: 1/2 ppd  Location/Symptom: cough  Duration: Intermittent  Modifying Factors: worse when laying flat, better sitting up  Severity: mild    Nursing Notes were reviewed. REVIEW OF SYSTEMS    (2-9 systems for level 4, 10 or more for level 5)     Review of Systems   C/o cough  denies shortness of breath, denies chest pain, denies any fevers or chills, denies any abdominal pain nausea or vomiting    Except as noted above the remainder of the review of systems was reviewed and negative. PAST MEDICAL HISTORY     Past Medical History:   Diagnosis Date    Abdominal bloating     Fibromyalgia     History of chickenpox     Pneumonia     hx. of  yearly.  Prolonged emergence from general anesthesia     Uterine fibroid     Vagina bleeding     relates bleeding every 2 week.      None otherwise stated in nurses notes    SURGICAL HISTORY       Past Surgical History:   Procedure Laterality Date    ECTOPIC PREGNANCY SURGERY      hx. of and salpingectomy    FOOT SURGERY Left     LAPAROSCOPY  3/3/16    hysteroscopy with D&C    [place] LAP,VAG HYST,UTERUS 250GMS/< N/A 4/19/2018    HYSTERECTOMY VAGINAL LAPAROSCOPIC ASSISTED (LAVH) performed by Briseida Rosen MD at [address]       None otherwise stated in nurses notes    CURRENT MEDICATIONS       Previous Medications    NICOTINE POLACRILEX (NICORETTE) 2 MG GUM    Take 1 each by mouth as needed for Smoking cessation       ALLERGIES     Bactrim [sulfamethoxazole-trimethoprim] and Succinylcholine    FAMILY HISTORY           Problem Relation Age of Onset    Diabetes Mother     High Blood Pressure Mother     Cancer Father     Heart Attack Father      Family Status   Relation Name Status    Mother  Alive    Father        None otherwise stated in nurses notes    SOCIAL HISTORY      reports that she has been smoking cigarettes. She started smoking about 30 years ago. She has a 5.00 pack-year smoking history. She has never used smokeless tobacco. She reports that she does not drink alcohol or use drugs. lives at home with others     PHYSICAL EXAM    (up to 7 for level 4, 8 or more for level 5)     ED Triage Vitals [20]   BP Temp Temp Source Pulse Resp SpO2 Height Weight   (!) 147/96 98.1 °F (36.7 °C) Oral 94 16 100 % -- --       Physical Exam   Nursing note and vitals reviewed. Constitutional: Oriented to person, place, and time and well-developed, well-nourished. HENT: Normocephalic and atraumatic. External ears normal. Nose normal and midline. Eyes: Conjunctivae and EOM are normal. Pupils are equal, round, and reactive to light. Musculoskeletal: Normal range of motion. Skin: Skin is warm and dry. No rash noted. No erythema. No pallor. no diaphoresis             DIAGNOSTIC RESULTS     EKG: All EKG's are interpreted by the Emergency Department Physician who either signs or Co-signs this chart in the absence of a cardiologist.        RADIOLOGY:   All plain film, CT, MRI, and formal ultrasound images (except ED bedside ultrasound) are read by the radiologist and the images and interpretations are directly viewed by the emergency physician.    No orders to display           LABS:  Labs Reviewed - No data to display    All instructions, and to return immediately for worsening of symptoms. The patient is agreeable with this plan. ED MEDS:  Orders Placed This Encounter   Medications    predniSONE (DELTASONE) 50 MG tablet     Sig: Take 1 tablet by mouth daily     Dispense:  5 tablet     Refill:  0    albuterol sulfate HFA (VENTOLIN HFA) 108 (90 Base) MCG/ACT inhaler     Sig: Inhale 2 puffs into the lungs 4 times daily as needed for Wheezing     Dispense:  3 Inhaler     Refill:  1    loratadine (CLARITIN) 10 MG tablet     Sig: Take 1 tablet by mouth daily     Dispense:  30 tablet     Refill:  0         CONSULTS:  None    PROCEDURES:  None      FINAL IMPRESSION      1. Cough    2.  Viral illness          DISPOSITION/PLAN   DISPOSITION Decision To Discharge    PATIENT REFERRED TO:  VALDEMAR Bennett - CNP  [address]  [address] [place] [phone]    Schedule an appointment as soon as possible for a visit       [facility] ED  [place] [address]  [phone]    For worsening symptoms, or any other concern      DISCHARGE MEDICATIONS:  New Prescriptions    ALBUTEROL SULFATE HFA (VENTOLIN HFA) 108 (90 BASE) MCG/ACT INHALER    Inhale 2 puffs into the lungs 4 times daily as needed for Wheezing    LORATADINE (CLARITIN) 10 MG TABLET    Take 1 tablet by mouth daily    PREDNISONE (DELTASONE) 50 MG TABLET    Take 1 tablet by mouth daily       (Please note that portions of this note were completed with a voice recognition program.  Efforts were made to edit the dictations but occasionally words are mis-transcribed.)    Sisi Horton, [address] [place] [address], PA  03/30/20 2047

## 2020-06-15 ENCOUNTER — TELEPHONE (OUTPATIENT)
Dept: INTERNAL MEDICINE CLINIC | Age: 45
End: 2020-06-15

## 2020-06-15 NOTE — TELEPHONE ENCOUNTER
Patient called in and would like a phone call informing her of the date that her TB test was administered. Patient can be reached at [phone].

## 2020-10-06 ENCOUNTER — APPOINTMENT (OUTPATIENT)
Dept: GENERAL RADIOLOGY | Age: 45
End: 2020-10-06

## 2020-10-06 ENCOUNTER — HOSPITAL ENCOUNTER (EMERGENCY)
Age: 45
Discharge: HOME | End: 2020-10-06
Attending: EMERGENCY MEDICINE

## 2020-10-06 VITALS
OXYGEN SATURATION: 100 % | BODY MASS INDEX: 22.16 KG/M2 | SYSTOLIC BLOOD PRESSURE: 141 MMHG | HEART RATE: 88 BPM | TEMPERATURE: 97.2 F | HEIGHT: 65 IN | RESPIRATION RATE: 16 BRPM | WEIGHT: 133 LBS | DIASTOLIC BLOOD PRESSURE: 93 MMHG

## 2020-10-06 PROCEDURE — 99283 EMERGENCY DEPT VISIT LOW MDM: CPT

## 2020-10-06 PROCEDURE — 93971 EXTREMITY STUDY: CPT

## 2020-10-06 PROCEDURE — 99284 EMERGENCY DEPT VISIT MOD MDM: CPT

## 2020-10-06 PROCEDURE — 73562 X-RAY EXAM OF KNEE 3: CPT

## 2020-10-06 ASSESSMENT — PAIN DESCRIPTION - LOCATION: LOCATION: KNEE

## 2020-10-06 ASSESSMENT — PAIN DESCRIPTION - ORIENTATION: ORIENTATION: RIGHT

## 2020-10-06 ASSESSMENT — PAIN SCALES - GENERAL: PAINLEVEL_OUTOF10: 8

## 2020-10-06 ASSESSMENT — PAIN DESCRIPTION - PAIN TYPE: TYPE: ACUTE PAIN

## 2020-10-06 NOTE — ED NOTES
Mode of arrival (squad #, walk in, police, etc) : walk in        Chief complaint(s): right knee pain        Arrival Note (brief scenario, treatment PTA, etc). : Pt arrives to ED c/o right knee pain for the past few days. Pt states it has been getting worse. Pt denies any injury to knee. Pt is A&Ox4, eupneic, PWD. GCS=15. Call light in reach. C= \"Have you ever felt that you should Cut down on your drinking? \"  No  A= \"Have people Annoyed you by criticizing your drinking? \"  No  G= \"Have you ever felt bad or Guilty about your drinking? \"  No  E= \"Have you ever had a drink as an Eye-opener first thing in the morning to steady your nerves or to help a hangover? \"  No      Deferred []      Reason for deferring: N/A    *If yes to two or more: probable alcohol abuse. Red Mirza RN  10/06/20 2820

## 2020-10-06 NOTE — ED PROVIDER NOTES
[address] ED  eMERGENCY dEPARTMENT eNCOUnter      Pt Name: Skinny Mak  MRN: [id number]  [place] 1975  Date of evaluation: 10/6/2020  Provider: Paulina Hook PA-C    CHIEF COMPLAINT       Chief Complaint   Patient presents with    Knee Pain           HISTORY OF PRESENT ILLNESS  (Location/Symptom, Timing/Onset, Context/Setting, Quality, Duration, Modifying Factors, Severity.)   Skinny Mak is a 54 y.o. female who presents to the emergency department with complaints of right knee pain. States she has been having pain in her right knee for around a month. Patient states over the past few days pain has worsened and today it has become severe. Patient states pain is mainly over the medial aspect of her knee in the posterior aspect. She does report swelling in the back of her knee. States she has increased pain when she changes from sitting to standing, stairs, squatting. Patient does admit to some crepitus. Denies injury. She denies any calf pain or swelling. No history of DVT, PE, recent traveling, surgeries, immobilization, chemotherapy, estrogen use. Patient has not tried anything for pain. No other complaints. Nursing Notes were reviewed. REVIEW OF SYSTEMS    (2-9 systems for level 4, 10 or more for level 5)     Review of Systems   Knee pain  Swelling      Except as noted above the remainder of the review of systems was reviewed and negative. PAST MEDICAL HISTORY     Past Medical History:   Diagnosis Date    Abdominal bloating     Fibromyalgia     History of chickenpox     Pneumonia     hx. of  yearly.  Prolonged emergence from general anesthesia     Uterine fibroid     Vagina bleeding     relates bleeding every 2 week.      None otherwise stated in nurses notes    SURGICAL HISTORY       Past Surgical History:   Procedure Laterality Date    ECTOPIC PREGNANCY SURGERY      hx. of and salpingectomy    FOOT SURGERY Left     LAPAROSCOPY  3/3/16    hysteroscopy with D&C    [place] LAP,VAG HYST,UTERUS 250GMS/< N/A 2018    HYSTERECTOMY VAGINAL LAPAROSCOPIC ASSISTED (LAVH) performed by Doris Menchaca MD at [address]       None otherwise stated in nurses notes    CURRENT MEDICATIONS       Previous Medications    ALBUTEROL SULFATE HFA (VENTOLIN HFA) 108 (90 BASE) MCG/ACT INHALER    Inhale 2 puffs into the lungs 4 times daily as needed for Wheezing    LORATADINE (CLARITIN) 10 MG TABLET    Take 1 tablet by mouth daily    NICOTINE POLACRILEX (NICORETTE) 2 MG GUM    Take 1 each by mouth as needed for Smoking cessation    PREDNISONE (DELTASONE) 50 MG TABLET    Take 1 tablet by mouth daily       ALLERGIES     Bactrim [sulfamethoxazole-trimethoprim] and Succinylcholine    FAMILY HISTORY           Problem Relation Age of Onset    Diabetes Mother     High Blood Pressure Mother     Cancer Father     Heart Attack Father      Family Status   Relation Name Status    Mother  Alive    Father        None otherwise stated in nurses notes    SOCIAL HISTORY      reports that she has been smoking cigarettes. She started smoking about 30 years ago. She has a 5.00 pack-year smoking history. She has never used smokeless tobacco. She reports that she does not drink alcohol or use drugs. lives at home with others     PHYSICAL EXAM    (up to 7 for level 4, 8 or more for level 5)     ED Triage Vitals [10/06/20 1221]   BP Temp Temp Source Pulse Resp SpO2 Height Weight   (!) 141/93 97.2 °F (36.2 °C) Temporal 88 16 100 % 5' 5\" (1.651 m) 133 lb (60.3 kg)       Physical Exam   Nursing note and vitals reviewed. Constitutional: Oriented to person, place, and time and well-developed, well-nourished. Head: Normocephalic and atraumatic. Ear: External ears normal.   Nose: Nose normal and midline. Eyes: Conjunctivae and EOM are normal. Pupils are equal, round, and reactive to light. Neck: Normal range of motion. Neck supple.    Throat: Posterior pharynx is without erythema or exudates, airway is patent, no swelling  Cardiovascular: Normal rate, regular rhythm, normal heart sounds and intact distal pulses. Pulmonary/Chest: Effort normal and breath sounds normal. No respiratory distress. No wheezes. No rales. No chest tenderness. Abdominal: Soft. Bowel sounds are normal. No distension and no mass. There is no tenderness. There is no rebound and no guarding. Musculoskeletal: Examination of the right knee reveals no visible deformities, bruising, effusion, erythema. Patient does have swelling and tenderness over the popliteal.  There is medial joint line tenderness. Mild crepitus noted over the patella. No calf tenderness or swelling. Full range of motion. 5/5 strength. Ligamentously stable. 2/2 DP PT pulses. Brisk cap refill. Distal sensation intact. Ambulatory. Neurological: Alert and oriented to person, place, and time. GCS score is 15. Skin: Skin is warm and dry. No rash noted. No erythema. No pallor. Psychiatric: Mood, memory, affect and judgment normal.           DIAGNOSTIC RESULTS     EKG: All EKG's are interpreted by the Emergency Department Physician who either signs or Co-signs this chart in the absence of a cardiologist.        RADIOLOGY:   All plain film, CT, MRI, and formal ultrasound images (except ED bedside ultrasound) are read by the radiologist, see reports below, unless otherwise noted in MDM or here. XR KNEE RIGHT (3 VIEWS)   Final Result   1. Stable small osteochondroma along the medial aspect of the proximal tibial   metaphysis. 2. No acute fracture or dislocation. VL Lower Extremity Venous Right    (Results Pending)       No results found. LABS:  Labs Reviewed - No data to display    All other labs were within normal range or not returned as of this dictation.     EMERGENCY DEPARTMENT COURSE and DIFFERENTIAL DIAGNOSIS/MDM:   Vitals:    Vitals:    10/06/20 1221   BP: (!) 141/93   Pulse: 88   Resp: 16   Temp: 97.2 °F (36.2 °C)

## 2020-10-07 NOTE — ED PROVIDER NOTES
[address] ED  eMERGENCY dEPARTMENT eNCOUnter   Independent Attestation     Pt Name: Em Morgan  MRN: [id number]  [place] 1975  Date of evaluation: 10/6/20   Em Morgan is a 54 y.o. female who presents with Knee Pain    Vitals:   Vitals:    10/06/20 1221   BP: (!) 141/93   Pulse: 88   Resp: 16   Temp: 97.2 °F (36.2 °C)   TempSrc: Temporal   SpO2: 100%   Weight: 133 lb (60.3 kg)   Height: 5' 5\" (1.651 m)     Impression:   1. Acute pain of right knee      I was personally available for consultation in the Emergency Department. I have reviewed the chart and agree with the documentation as recorded by the [facility], including the assessment, treatment plan and disposition.   Saumya Madden MD  Attending Emergency  Physician                  Saumya Madden MD  10/07/20 5534

## 2020-10-11 ENCOUNTER — APPOINTMENT (OUTPATIENT)
Dept: CT IMAGING | Age: 45
End: 2020-10-11
Payer: OTHER MISCELLANEOUS

## 2020-10-11 ENCOUNTER — HOSPITAL ENCOUNTER (EMERGENCY)
Age: 45
Discharge: HOME | End: 2020-10-11
Attending: EMERGENCY MEDICINE
Payer: OTHER MISCELLANEOUS

## 2020-10-11 VITALS
DIASTOLIC BLOOD PRESSURE: 101 MMHG | HEART RATE: 107 BPM | TEMPERATURE: 98.8 F | OXYGEN SATURATION: 99 % | SYSTOLIC BLOOD PRESSURE: 148 MMHG | RESPIRATION RATE: 18 BRPM

## 2020-10-11 LAB
-: NORMAL
ANION GAP SERPL CALCULATED.3IONS-SCNC: 8 MMOL/L (ref 9–17)
BUN BLDV-MCNC: 9 MG/DL (ref 6–20)
BUN/CREAT BLD: ABNORMAL (ref 9–20)
CALCIUM SERPL-MCNC: 9.3 MG/DL (ref 8.6–10.4)
CHLORIDE BLD-SCNC: 105 MMOL/L (ref 98–107)
CO2: 26 MMOL/L (ref 20–31)
CREAT SERPL-MCNC: 0.65 MG/DL (ref 0.5–0.9)
GFR AFRICAN AMERICAN: >60 ML/MIN
GFR NON-AFRICAN AMERICAN: >60 ML/MIN
GFR SERPL CREATININE-BSD FRML MDRD: ABNORMAL ML/MIN/{1.73_M2}
GFR SERPL CREATININE-BSD FRML MDRD: ABNORMAL ML/MIN/{1.73_M2}
GLUCOSE BLD-MCNC: 86 MG/DL (ref 70–99)
HCG QUALITATIVE: NEGATIVE
POTASSIUM SERPL-SCNC: 4.5 MMOL/L (ref 3.7–5.3)
REASON FOR REJECTION: NORMAL
SODIUM BLD-SCNC: 139 MMOL/L (ref 135–144)
ZZ NTE CLEAN UP: ORDERED TEST: NORMAL
ZZ NTE WITH NAME CLEAN UP: SPECIMEN SOURCE: NORMAL

## 2020-10-11 PROCEDURE — 99284 EMERGENCY DEPT VISIT MOD MDM: CPT

## 2020-10-11 PROCEDURE — 6360000002 HC RX W HCPCS: Performed by: STUDENT IN AN ORGANIZED HEALTH CARE EDUCATION/TRAINING PROGRAM

## 2020-10-11 PROCEDURE — 6360000004 HC RX CONTRAST MEDICATION: Performed by: STUDENT IN AN ORGANIZED HEALTH CARE EDUCATION/TRAINING PROGRAM

## 2020-10-11 PROCEDURE — 84703 CHORIONIC GONADOTROPIN ASSAY: CPT

## 2020-10-11 PROCEDURE — 6370000000 HC RX 637 (ALT 250 FOR IP): Performed by: STUDENT IN AN ORGANIZED HEALTH CARE EDUCATION/TRAINING PROGRAM

## 2020-10-11 PROCEDURE — 96374 THER/PROPH/DIAG INJ IV PUSH: CPT

## 2020-10-11 PROCEDURE — 94664 DEMO&/EVAL PT USE INHALER: CPT

## 2020-10-11 PROCEDURE — 80048 BASIC METABOLIC PNL TOTAL CA: CPT

## 2020-10-11 PROCEDURE — 74177 CT ABD & PELVIS W/CONTRAST: CPT

## 2020-10-11 RX ORDER — IBUPROFEN 600 MG/1
600 TABLET ORAL EVERY 6 HOURS PRN
Qty: 30 TABLET | Refills: 0 | OUTPATIENT
Start: 2020-10-11 | End: 2022-02-10

## 2020-10-11 RX ORDER — CYCLOBENZAPRINE HCL 5 MG
5 TABLET ORAL 3 TIMES DAILY PRN
Qty: 30 TABLET | Refills: 0 | Status: SHIPPED | OUTPATIENT
Start: 2020-10-11 | End: 2020-10-21

## 2020-10-11 RX ORDER — CYCLOBENZAPRINE HCL 10 MG
10 TABLET ORAL ONCE
Status: COMPLETED | OUTPATIENT
Start: 2020-10-11 | End: 2020-10-11

## 2020-10-11 RX ORDER — LIDOCAINE 4 G/G
1 PATCH TOPICAL DAILY
Qty: 30 PATCH | Refills: 0 | Status: SHIPPED | OUTPATIENT
Start: 2020-10-11 | End: 2020-11-10

## 2020-10-11 RX ORDER — KETOROLAC TROMETHAMINE 15 MG/ML
15 INJECTION, SOLUTION INTRAMUSCULAR; INTRAVENOUS ONCE
Status: COMPLETED | OUTPATIENT
Start: 2020-10-11 | End: 2020-10-11

## 2020-10-11 RX ORDER — ACETAMINOPHEN 500 MG
1000 TABLET ORAL ONCE
Status: COMPLETED | OUTPATIENT
Start: 2020-10-11 | End: 2020-10-11

## 2020-10-11 RX ADMIN — ACETAMINOPHEN 1000 MG: 500 TABLET ORAL at 11:23

## 2020-10-11 RX ADMIN — KETOROLAC TROMETHAMINE 15 MG: 15 INJECTION, SOLUTION INTRAMUSCULAR; INTRAVENOUS at 11:23

## 2020-10-11 RX ADMIN — IOPAMIDOL 75 ML: 755 INJECTION, SOLUTION INTRAVENOUS at 12:19

## 2020-10-11 RX ADMIN — CYCLOBENZAPRINE 10 MG: 10 TABLET, FILM COATED ORAL at 11:23

## 2020-10-11 ASSESSMENT — ENCOUNTER SYMPTOMS
SHORTNESS OF BREATH: 1
RHINORRHEA: 0
COUGH: 0
DIARRHEA: 0
APNEA: 0
BLOOD IN STOOL: 0
WHEEZING: 0
VOMITING: 0
NAUSEA: 0
SORE THROAT: 0
ABDOMINAL PAIN: 0
EYE PAIN: 0

## 2020-10-11 ASSESSMENT — PAIN SCALES - GENERAL: PAINLEVEL_OUTOF10: 9

## 2020-10-11 NOTE — ED NOTES
House Officer Death Pronouncement    Called by nursing staff to pronounce Michelle Polo dead.    Physical Exam: Unresponsive to noxious stimuli, Spontaneous respirations absent, Breath sounds absent and Heart sounds absent    Patient was pronounced dead at 10: 58 AM, 2019.    Active Problems:    COPD exacerbation (H)       Infectious disease present?: NO    Communicable disease present? (examples: HIV, chicken pox, TB, Ebola, CJD) :  NO    Multi-drug resistant organism present? (example: MRSA): NO    Please consider an autopsy if any of the following exist:  NO Unexpected or unexplained death during or following any dental, medical, or surgical diagnostic treatment procedures.   NO Death of mother at or up to seven days after delivery.     NO All  and pediatric deaths.     NO Death where the cause is sufficiently obscure to delay completion of the death certificate.   NO Deaths in which autopsy would confirm a suspected illness/condition that would affect surviving family members or recipients of transplanted organs.     The following deaths must be reported to the 's Office:  NO A death that may be due entirely or in part to any factors other than natural disease (recent surgery, recent trauma, suspected abuse/neglect).   NO A death that may be an accident, suicide, or homicide.     NO Any sudden, unexpected death in which there is no prior history of significant heart disease or any other condition associated with sudden death.   NO A death under suspicious, unusual, or unexpected circumstances.    NO Any death which is apparently due to natural causes but in which the  does not have a personal physician familiar with the patient s medical history, social, or environmental situation or the circumstances of the terminal event.   NO Any death apparently due to Sudden Infant Death Syndrome.     NO Deaths that occur during, in association with, or as consequences of a diagnostic,  Pt back in room from CT in [facility]   Will continue to assess      Max Arvizu, RN  10/11/20 6040 therapeutic, or anesthetic procedure.   NO Any death in which a fracture of a major bone has occurred within the past (6) six months.   NO A death of persons note seen by their physician within 120 days of demise.     NO Any death in which the  was an inmate of a public institution or was in the custody of Law Enforcement personnel.   NO  All unexpected deaths of children   NO Solid organ donors   NO Unidentified bodies   NO Deaths of persons whose bodies are to be cremated or otherwise disposed of so that the bodies will later be unavailable for examination;   NO Deaths unattended by a physician outside of a licensed healthcare facility or licensed residential hospice program   NO Deaths occurring within 24 hours of arrival to a health care facility if death is unexpected.    NO Deaths associated with the decedent s employment.   NO Deaths attributed to acts of terrorism.   NO Any death in which there is uncertainty as to whether it is a medical examiner s care should be discussed with the medical investigator.      Death Certificate to be directed to Dr. Gallegos, Hospitalist.     Attending physician, Dr. Arndt, Hospitalist, notified of death.    Body disposition: per family    LISANDRO Christianson, CNP  Hospitalist Service, House Officer  [facility]     Text Page  Pager: [phone]
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
## 2020-10-11 NOTE — ED NOTES
Wood Arora RT at bedside with incentive spirometer      [facility]  10/11/20 8563
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
## 2020-10-11 NOTE — ED NOTES
Bed: 15  Expected date:   Expected time:   Means of arrival:   Comments:  MAGDALENE Salazar RN  10/11/20 1037
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
## 2020-10-15 ENCOUNTER — TELEPHONE (OUTPATIENT)
Dept: INTERNAL MEDICINE CLINIC | Age: 45
End: 2020-10-15

## 2022-02-10 ENCOUNTER — APPOINTMENT (OUTPATIENT)
Dept: CT IMAGING | Age: 47
End: 2022-02-10
Payer: COMMERCIAL

## 2022-02-10 ENCOUNTER — HOSPITAL ENCOUNTER (EMERGENCY)
Age: 47
Discharge: HOME | End: 2022-02-10
Attending: EMERGENCY MEDICINE
Payer: COMMERCIAL

## 2022-02-10 ENCOUNTER — APPOINTMENT (OUTPATIENT)
Dept: GENERAL RADIOLOGY | Age: 47
End: 2022-02-10
Payer: COMMERCIAL

## 2022-02-10 VITALS
SYSTOLIC BLOOD PRESSURE: 167 MMHG | DIASTOLIC BLOOD PRESSURE: 99 MMHG | RESPIRATION RATE: 18 BRPM | WEIGHT: 140 LBS | HEART RATE: 107 BPM | OXYGEN SATURATION: 100 % | TEMPERATURE: 97.8 F | BODY MASS INDEX: 23.32 KG/M2 | HEIGHT: 65 IN

## 2022-02-10 DIAGNOSIS — S30.0XXA CONTUSION OF LOWER BACK, INITIAL ENCOUNTER: ICD-10-CM

## 2022-02-10 DIAGNOSIS — S59.901A ELBOW INJURY, RIGHT, INITIAL ENCOUNTER: ICD-10-CM

## 2022-02-10 DIAGNOSIS — S39.92XA LOWER BACK INJURY, INITIAL ENCOUNTER: Primary | ICD-10-CM

## 2022-02-10 PROCEDURE — 73080 X-RAY EXAM OF ELBOW: CPT

## 2022-02-10 PROCEDURE — 6360000002 HC RX W HCPCS: Performed by: EMERGENCY MEDICINE

## 2022-02-10 PROCEDURE — 72131 CT LUMBAR SPINE W/O DYE: CPT

## 2022-02-10 PROCEDURE — 72192 CT PELVIS W/O DYE: CPT

## 2022-02-10 PROCEDURE — 6370000000 HC RX 637 (ALT 250 FOR IP): Performed by: EMERGENCY MEDICINE

## 2022-02-10 PROCEDURE — 96372 THER/PROPH/DIAG INJ SC/IM: CPT

## 2022-02-10 PROCEDURE — 99284 EMERGENCY DEPT VISIT MOD MDM: CPT

## 2022-02-10 RX ORDER — NAPROXEN 500 MG/1
500 TABLET ORAL 2 TIMES DAILY
Qty: 20 TABLET | Refills: 0 | Status: SHIPPED | OUTPATIENT
Start: 2022-02-10

## 2022-02-10 RX ORDER — CYCLOBENZAPRINE HCL 10 MG
10 TABLET ORAL 3 TIMES DAILY PRN
Qty: 30 TABLET | Refills: 0 | Status: SHIPPED | OUTPATIENT
Start: 2022-02-10 | End: 2022-02-20

## 2022-02-10 RX ORDER — ACETAMINOPHEN 500 MG
1000 TABLET ORAL EVERY 6 HOURS PRN
Qty: 60 TABLET | Refills: 0 | Status: SHIPPED | OUTPATIENT
Start: 2022-02-10

## 2022-02-10 RX ORDER — ONDANSETRON 4 MG/1
4 TABLET, ORALLY DISINTEGRATING ORAL ONCE
Status: COMPLETED | OUTPATIENT
Start: 2022-02-10 | End: 2022-02-10

## 2022-02-10 RX ADMIN — ONDANSETRON 4 MG: 4 TABLET, ORALLY DISINTEGRATING ORAL at 09:43

## 2022-02-10 RX ADMIN — HYDROMORPHONE HYDROCHLORIDE 1 MG: 1 INJECTION, SOLUTION INTRAMUSCULAR; INTRAVENOUS; SUBCUTANEOUS at 08:16

## 2022-02-10 ASSESSMENT — ENCOUNTER SYMPTOMS: VISUAL CHANGE: 0

## 2022-02-10 ASSESSMENT — PAIN SCALES - GENERAL
PAINLEVEL_OUTOF10: 10
PAINLEVEL_OUTOF10: 10
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
## 2022-02-10 NOTE — ED NOTES
Mode of arrival (squad #, walk in, police, etc) : walk in         Chief complaint(s): fall, tailbone pain, elbow pain        Arrival Note (brief scenario, treatment PTA, etc). : patient states she was walking into work this morning when she slipped on ice. Patient states she fell backwards landing on her buttocks and right elbow. Patient denies any LOC. Patient is alert and oriented x4.         C= \"Have you ever felt that you should Cut down on your drinking? \"  No  A= \"Have people Annoyed you by criticizing your drinking? \"  No  G= \"Have you ever felt bad or Guilty about your drinking? \"  No  E= \"Have you ever had a drink as an Eye-opener first thing in the morning to steady your nerves or to help a hangover? \"  No      Deferred []      Reason for deferring: N/A    *If yes to two or more: probable alcohol abuse. Kayden Goddard RN  02/10/22 1007

## 2022-02-10 NOTE — Clinical Note
Sharath Martinez was seen and treated in our emergency department on 2/10/2022. She may return to work on 02/14/2022. If you have any questions or concerns, please don't hesitate to call.       Bina Yusuf MD

## 2022-02-10 NOTE — ED NOTES
Patient placed in bed. c-collar in place. Patient lying on her side.       Werner Jain, RN  02/10/22 0168

## 2022-02-25 ENCOUNTER — HOSPITAL ENCOUNTER (OUTPATIENT)
Age: 47
Discharge: HOME | End: 2022-02-25

## 2022-02-25 ENCOUNTER — HOSPITAL ENCOUNTER (OUTPATIENT)
Dept: GENERAL RADIOLOGY | Age: 47
Discharge: HOME | End: 2022-02-27

## 2022-02-25 DIAGNOSIS — R52 PAIN: ICD-10-CM

## 2022-02-25 PROCEDURE — 73110 X-RAY EXAM OF WRIST: CPT

## 2022-02-28 ENCOUNTER — HOSPITAL ENCOUNTER (OUTPATIENT)
Dept: PHYSICAL THERAPY | Age: 47
Setting detail: THERAPIES SERIES
Discharge: HOME | End: 2022-02-28
Payer: COMMERCIAL

## 2022-02-28 PROCEDURE — 97162 PT EVAL MOD COMPLEX 30 MIN: CPT

## 2022-02-28 PROCEDURE — 97110 THERAPEUTIC EXERCISES: CPT

## 2022-02-28 NOTE — PROGRESS NOTES
509 [facility] Outpatient Physical Therapy  [address] [place] [address]. [address]         Phone: [phone]       Fax: [phone]    Physical Therapy Spine Evaluation    Date:  2022  Patient: Lori Browning  : 1975  MRN: [id number]  Physician: Rajiv Thurman MD    Medical Diagnosis: S39.012D (ICD-10-CM) - Strain of muscle, fascia and tendon of lower back, subsequent encounter, S16. 1XXD (ICD-10-CM) - Strain of muscle, fascia and tendon at neck level, subsequent encounter,S50.311D (ICD-10-CM) - Abrasion of right elbow, subsequent encounter, S63.602D (ICD-10-CM) - Unspecified sprain of left thumb, subsequent encounter,S30. 0XXD (ICD-10-CM) - Contusion of lower back and pelvis, subsequent encounter   Clinical Diagnosis: Sacral pain with mobility deficits/(L) thumb pain     Onset date: 02/10/2022    Next Dr's appt.: 2022  PT Visit Information  PT Insurance Information: Worker's Comp  Total # of Visits Approved: 10  Total # of Visits to Date: 1  No Show: 0  Canceled Appointment: 0     Subjective  CC: (L) wrist pain, low back/sacral pain   HPI: (02/10/2022) Patient stated that she fell on the ice on 02/10/2022 while walking into work. She fell backwards striking her buttocks and head and (R) elbow. (-) LOC She was not sure how the (L) wrist got involved. She immediately went to [facility]/images were taken and she was released to home. She followed up with [facility] the next day and was prescribed Flexeril and Naproxen. She stated the Flexeril has been helping her symptoms. However, she felt the Naproxen was of little help. She followed up with [facility] on 2022 and was prescribed a wrist brace for the (L) UE. She stated that she was to wear it as tolerated. However,it is  very painful to wear. In addition, she was advised to start performing active motion with the thumb and wrist motions. She followed up a 3rd time with [facility] on 2022 and saw Dr. Sonny Gonzalez.  Plain films of the (L) wrist were take and a physical therapy prescription was provided. Comment:  She stated that she had some neck and (R) elbow pain following the incident. However, she stated that those two areas have return to normal and did not feel physical therapy was needed for them. PMHx: [] Unremarkable [] Diabetes [] HTN  [] Pacemaker   [] MI/Heart Problems [] Cancer [] Arthritis   [x] Other: Prolonged emergence from general anesthesia; Pneumonia; Abdominal bloating; Uterine fibroid; Vagina bleeding; Fibromyalgia; History of chickenpox              [x] Refer to full medical chart  In EPIC     Comorbidities  [] Obesity [] Dialysis  [x] Other: Fibromyalgia   [] Asthma/COPD [] Dementia [] Other:   [] Stroke [] Sleep apnea [] Other:   [] Vascular disease [] Rheumatic disease [] Other:     Tests: [x] X-Ray: (R) elbow - No acute osseous or soft tissue abnormality. (L) wrist No acute abnormality left wrist.  No marked degenerative change. [] MRI:    [x] Other: CT scan of low back - No acute fracture or malalignment of the lumbar spine. CT scan of pelvis - No acute osseous or soft tissue abnormality.     Medications: [x] Refer to full medical record [] None [] Other:  Allergies:      [x] Refer to full medical record [] None [] Other:     Normal Deficit Comments   Follows commands [x] []    Vision [x] []    Hearing [x] []    Orientation [x] []      Pain  Pain:  [x] Yes   [] No      Location: the entire sacral bone  Pain Rating: (0-10 scale)  5/10 without pain medication   Worst: 5/10 without pain medication   Best: 5/10 without pain medication   Symptoms:  [x] Improving [] Worsening       [] Same  Descriptors: Constant, sharp and aching   Aggravating factors: sitting, standing and walking   Relieving factors: rest and repositioning   Sleep: Disturbed   Other:     Pain  Pain:  [x] Yes   [] No      Location: proximal region of the (L) thumb to the joint line of the wrist   Pain Rating: (0-10 scale) 5/10 without pain medication Worst: 7/10    Best: 5/10   Symptoms:  [x] Improving [] Worsening       [] Same  Descriptors: Constant, achy   Aggravating factors: ADLs involving the (L) wrist/hand   Relieving factors: rest, repositioning   Sleep: Disturbed   Other:     Function  Hand Dominance  [x] Right  [] Left  Working:  [] Normal Duty  [] Light Duty  [x] Off D/T Condition  [] Retired    [] Not Employed    []  Disability  [] Other:           Return to work:   Job/ADL Description: [employer] @  [address] [place]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)  Restrictions - no lifting greater than 5-10 lbs, bending, no prolonged periods of standing, walking, sitting, no pushing/pulling     ADL/IADL Previous level of function Current level of function Who currently assists the patient with task/Comments   Bathing  [x] Independent  [] Assist [x] Independent  [] Assist    Dress/grooming [x] Independent  [] Assist [x] Independent  [] Assist    Transfer/mobility [x] Independent  [] Assist [x] Independent  [] Assist    Feeding [x] Independent  [] Assist [x] Independent  [] Assist    Toileting [x] Independent  [] Assist [x] Independent  [] Assist    Driving [x] Independent  [] Assist [x] Independent  [] Assist    Housekeeping [x] Independent  [] Assist [] Independent  [x] Assist    Grocery shop/meal prep [x] Independent  [] Assist [] Independent  [x] Assist      Gait Prior level of function Current level of function    [x] Independent  [] Assist [x] Independent  [] Assist   Device: [x] Independent [x] Independent    [] Straight Cane [] Quad cane [] Straight Cane [] Quad cane    [] Standard walker [] Rolling walker   [] 4 wheeled walker [] Standard walker [] Rolling walker   [] 4 wheeled walker    [] Wheelchair [] Wheelchair     Objective  Observation/Palpation   Normal Deficit Comments   Observation [] [x] Sits on one buttocks or the other   Unequal weight bearing more weight (L) than (R) (standing position)    Posture  [] []    Palpation [] [x] No tenderness to the touch within the sacral region.   Tenderness to the touch at the base of the (L) thumb/wrist joint    Edema [] []    Scar [] []    Other [] []      Range of Motion  Spine - Range of Motion  Cervical   WNL   Thoracic   WNL in all planes   Lumbar  Flexion - mid leg with pain @ end range and aberrant motion upon returning, Extension - WNL with pain @ end range, (R) side bend - WNL, (L) side bend - WNL     Right Lower Extremity - Active ROM  Hip flexion  WNL   Hip extension  WNL   Hip abduction  WNL   Hip adduction  WNL   Hip ER  WNL   Hip IR  WNL   Knee flexion  WNL   Knee extension  WNL   WNL   Dorsiflexion  WNL   Plantarflexion  WNL     Left Lower Extremity - Active ROM  Hip flexion  WNL   Hip extension  WNL   Hip abduction  WNL   Hip adduction  WNL   Hip ER  WNL   Hip IR  WNL   Knee flexion  WNL   Knee extension  WNL   Dorsiflexion  WNL   Plantarflexion  WNL     Strength  Spine Normal Deficit Comments   Anterior chain   [] [] N/A - unable to lay supine without pain    Posterior chain   [] [] N/A - unable to lay prone without pain    Lateral chain   [] [x] Limited with pain bilaterally      Right Lower Extremity - Strength  Hip flexion  5/5 MMT   Hip extension  5/5 MMT   Hip abduction  5/5 MMT   Hip adduction  5/5 MMT   Hip ER  5/5 MMT   Hip IR  5/5 MMT   Knee flexion  5/5 MMT   Knee extension  5/5 MMT   Dorsiflexion  5/5 MMT   Plantarflexion  5/5 MMT     Left Lower Extremity - Strength  Hip flexion  5/5 MMT   Hip extension  5/5 MMT   Hip abduction  5/5 MMT   Hip adduction  5/5 MMT   Hip ER  5/5 MMT   Hip IR  5/5 MMT   Knee flexion  5/5 MMT   Knee extension  5/5 MMT   Dorsiflexion  5/5 MMT   Plantarflexion  5/5 MMT     Right Upper Extremity - Active Range of Motion    Shoulder Flexion  WNL    Shoulder Extension  WNL    Shoulder Abduction  WNL    Shoulder ER  WNL    Shoulder IR  WNL    Elbow Flexion  WNL    Elbow Extension  WNL    Wrist Flexion  WNL    Wrist Extension  WNL    Wrist Ulnar Deviation  WNL    Wrist Radial Deviation  WNL      Left Upper Extremity - Active Range of Motion    Shoulder Flexion   WNL    Shoulder Extension  WNL    Shoulder Abduction  WNL    Shoulder ER  WNL    Shoulder IR  WNL    Elbow Flexion  WNL    Elbow Extension  WNL    Wrist Flexion  WNL with pain @ end range  Wrist Extension  WNL with pain @ end range  Wrist Ulnar Deviation  WNL with pain @ end range  Wrist Radial Deviation  WNL with pain @ end range    Right Upper Extremity - Strength  Shoulder Flexion   5/5 MMT   Shoulder Extension   5/5 MMT   Shoulder Abduction  5/5 MMT   Shoulder ER  5/5 MMT   Shoulder IR  5/5 MMT   Elbow Flexion  5/5 MMT   Elbow Extension  5/5 MMT   Wrist Flexion  5/5 MMT   Wrist Extension  5/5 MMT   Wrist Ulnar Deviation  5/5 MMT   Wrist Radial Deviation  5/5 MMT     Left  Upper Extremity - Strength  Shoulder Flexion   5/5 MMT   Shoulder Extension  5/5 MMT   Shoulder Abduction  5/5 MMT   Shoulder ER  5/5 MMT   Shoulder IR  5/5 MMT   Elbow Flexion  5/5 MMT   Elbow Extension  5/5 MMT   Wrist Flexion  N/A due to pain with active motion   Wrist Extension  N/A due to pain with active motion   Wrist Ulnar Deviation  N/A due to pain with active motion   Wrist Radial Deviation  N/A due to pain with active motion     Additional Measures    Normal Deficit Comments   Sensation   [] []    Reflexes   [] []    Gross motor   [] []    Flexibility    [] [x] (B) hamstring tightness was apparent    Special Tests   [] []     strength   [] []    Other   [] []      Gait Assessment  Assistive device: None  Comments: No gait deviations were grossly     Balance  N/A - given the patient's inability to stand with equal weightbearing on both LEs statically due to pain. Assessment  Patient presented with moderate irritability within the sacral region and (L) thumb/wrist. No tenderness was apparent within the sacral but was apparent within the distal thumb and wrist region. Limited spine motions were apparent with pain and aberrant motions noted.  Unable to perform any table's assessments due to the patient's inability to lay in any position without an increase in pain being reported. Some fear of movement may be a concern given the patient's high scores on the both the physical activity sub scale and work sub scale of the FABQ. High Risk was scored on the STarT back screening tool as well. Given these clinical findings, a clinical diagnosis of sacral pain with mobility deficits and (L) thumb pain would be appropriate. Patient would continue to benefit from skilled physical therapy services that includes aquatic therapy for improved motion and pain control. Problems  [x] ? Pain: 5-7/10     [x] ? ROM: limited in the spine with pain and aberrant motion      [x] ? Function: Quick DASH = 43 points, Modified Oswestry Low Back Disability Index = 76%  [x]  Postural Deviations Sits on one buttocks or the other, Unequal weight bearing more weight (L) than (R) (standing position)     Goals  LTG: (to be met in 10 treatments)  1. No complaints of pain will be reported within the (L) thumb or sacral region at rest/ADLs. 2. Modified Oswestry Low back Pain Disability Index improved by 10% (MDC)  3. Quick DASH improved by 10 points. 3. Pt will demonstrate independence with her completed home exercise program at discharge. Patient goals: To feel better    Functional Assessment Used: Modified Oswestry Low Back Disability Index   Current Status: Score: 76%  Goal Status: Score 66%    Functional Assessment Used: Quick DASH  Current Status: Score: 43 points   Goal Status: Score: 33 points     Evaluation Complexity: Moderate    History: Fibromyalgia, STarT Back Screening Tool (High Risk), FABQ physical activity sub scale - 24 and work sub scale - 24    Exam: Pain, limited motion and fear of movement per self-reported questionnaires   Clinical Presentation: Patient's symptoms are evolving.    Barriers to Learning: None     Rehab Potential:  [x] Good  [] Fair  [] Poor   Suggested Professional Modalities        [x]  Ther Exercise 10 1    []  Neuromuscular Re-ed      []  Gait Training      []  Manual Therapy      []  Ther Activities      []  Aquatics      []  Vasocompression      []  Cervical Traction      []  Other      Total Treatment time 55 min        TOTAL TREATMENT TIME: 45    Time in: 1130    Time Out: 1225    Electronically signed by: Meredith Can PT DPT      Physician Signature:________________________________Date:__________________  By signing above or cosigning this note, I have reviewed this plan of care and certify a need for medically necessary rehabilitation services.      *PLEASE SIGN ABOVE AND FAX BACK ALL PAGES*

## 2022-03-02 ENCOUNTER — HOSPITAL ENCOUNTER (OUTPATIENT)
Dept: PHYSICAL THERAPY | Age: 47
Setting detail: THERAPIES SERIES
Discharge: HOME | End: 2022-03-02
Payer: COMMERCIAL

## 2022-03-02 PROCEDURE — 97113 AQUATIC THERAPY/EXERCISES: CPT

## 2022-03-02 NOTE — FLOWSHEET NOTE
509 [facility] Outpatient Physical Therapy   [address] [facility] [address]   Phone: [phone]   Fax: [phone]    Physical Therapy Daily Treatment Note    Date:  3/2/2022  Patient Name:  Silviano Moran    :  1975  MRN: [id number]  Physician: Kendal Alford MD                       Medical Diagnosis: S39.012D (ICD-10-CM) - Strain of muscle, fascia and tendon of lower back, subsequent encounter, S16. 1XXD (ICD-10-CM) - Strain of muscle, fascia and tendon at neck level, subsequent encounter,S50.311D (ICD-10-CM) - Abrasion of right elbow, subsequent encounter, S63.602D (ICD-10-CM) - Unspecified sprain of left thumb, subsequent encounter,S30. 0XXD (ICD-10-CM) - Contusion of lower back and pelvis, subsequent encounter   Clinical Diagnosis: Sacral Pain with mobility deficits/(L) thumb pain   Onset date: 02/10/2022                  Next 's appt.: 2022  PT Visit Information  PT Insurance Information: Worker's Comp  Total # of Visits Approved: 10  Total # of Visits to Date: 2  No Show: 0  Canceled Appointment: 0    Subjective:  Patient reports walking and stepping up are most difficult due to pain in tailbone/sacral region. Continues to alternate ice/heat to assist with pain.  Has great difficulty sitting/lowering herself- especially to toilet- has to use arm assist    Pain  Pain 1:  [x] Yes   [] No               Location: the entire sacral bone  Pain Rating: (0-10 scale)  7/10 without pain medication     Symptoms:  [x] Improving   [] Worsening                 [] Same  Descriptors: Constant, sharp and aching   Aggravating factors: sitting, standing and walking   Relieving factors: rest and repositioning, ice/heat  Sleep: Disturbed   Other:      Pain 2:  [x] Yes   [] No               Location: Left Wrist  Pain Rating: (0-10 scale) 5/10 constant    Symptoms:  [x] Improving   [] Worsening                 [] Same  Descriptors: achy- shooting pain up to elbow intermittently  Aggravating factors: movement especially with gripping/holding objects  Relieving factors: rest, ice/heat, brace  Sleep: No issue     Comments: Initiated aquatic therapy with emphasis on core stability, mobility, pain relief and postural awareness. Patient educated on the benefits of aquatic therapy and encouraged avoidance of increased pain. [address] [facility] Exercise Log  Protocol Fibromyalgia    Date of Eval: 02/28/2022                               Primary PT: Chantal Cheek PT, DPT  Diagnosis: S39.012D (ICD-10-CM) - Strain of muscle, fascia and tendon of lower back, subsequent encounter, S16. 1XXD (ICD-10-CM) - Strain of muscle, fascia and tendon at neck level, subsequent encounter,S50.311D (ICD-10-CM) - Abrasion of right elbow, subsequent encounter, S63.602D (ICD-10-CM) - Unspecified sprain of left thumb, subsequent encounter,S30. 0XXD (ICD-10-CM) - Contusion of lower back and pelvis, subsequent encounter   Things to Focus On (goals): pain control, motion   Surgical Precautions:  Medical Precautions: Prolonged emergence from general anesthesia; Pneumonia; Abdominal bloating; Uterine fibroid; Vagina bleeding; Fibromyalgia; History of chickenpox  [x] C-9 dates- PRESUMPTIVE AUTH 3X/WK FOR 8 VISITS  [] Occ Med   [] Medicare     Name: Sang Ledbetter  Date 3/2/22       Visit # 2/10       Walk F/L/R 2 Laps       Marching 10x       LE Exercise        Squats 10x3\"       Step-Ups F/L        Heel-toe raises        SLR F/L/R 10x       HS Curl        Lunges        Knee flex/ext                UE exercises        Horiz abd/add 10x       Alt Flex/Ext 10x       Abd/add 10x       PNF        Bicep Curls 10x       IR/ER 10x       Wall Push-Ups                Kickboard Ex. Add      Iso Abd.         Push-pull        Paddling                Ball Shapes  Add                      Breast-stroke                UE Stretches        Corner stretch        Post. Capsule stretch                LE Stretches  Add      Hamstring        Achilles Quad                        Deep H2O 1 Noodle       Cycling 1'       Jacks        Cross-country        Rodolfo 3'               Cool Down        Pain Rating 5-7           Specific Instructions for next treatment: Assess response to initial visit and progress as able. Assessment: [x] Progressing toward goals. Completed program to tolerance in 30% WB aquatic environment. Patient notes mild discomfort with walking- especially side stepping- encouraged decreased step length. Guarded with mobility due to sacral pain and also keeps left hand in a guarded position with minimal use. Small, controlled movements encouraged with LE program- most difficulty with squats and open chain R LE exercise this date due to pain at tailbone. [] No change. [] Other:    [x] Patient would continue to benefit from skilled physical therapy services in order to:   STG: (to be met in 10  treatments)  1. Pt will report an average pain level of       within the lumbar region at rest/ADLs. 2. Pt will demonstrate improved ROM within all involved planes to assist with (I) function. 3. Pt will demonstrate knowledge of fall prevention. LTG: (to be met in  treatments)  1. Pt will report an average pain level of       within the lumbar region at rest/ADLs. 2. Modified Oswestry Low back Pain Disability Index improved by 10% (MDC)  3. Pt will demonstrate independence with his home exercise program.      Patient goals: To feel better    Pt. Education:  [] Yes  [x] No  [] Reviewed Prior HEP/Ed  Method of Education: [] Verbal  [] Demo  [] Written  Comprehension of Education:  [] Verbalizes understanding. [] Demonstrates understanding. [] Needs review. [] Demonstrates/verbalizes HEP/Ed previously given. Plan: [x] Continue per plan of care.    [] Other:      Treatment Charges: Mins Units Time In/Time Out   []  Modalities      []  Ther Exercise      []  Manual Therapy      []  Ther Activities      [x]  Aquatics 30 2 477 RT-210 PM   [] Neuromuscular      [] Vasocompression      [] Gait Training      [] Dry needling        [] 1 or 2 muscles        [] 3 or more muscles      []  Other      Total Treatment time 30 2      Time In: 441 PM           Time Out: 418 PM    Electronically signed by:  Bettina Lopez PTA

## 2022-03-04 ENCOUNTER — HOSPITAL ENCOUNTER (OUTPATIENT)
Dept: PHYSICAL THERAPY | Age: 47
Setting detail: THERAPIES SERIES
Discharge: HOME | End: 2022-03-04
Payer: COMMERCIAL

## 2022-03-04 PROCEDURE — 97113 AQUATIC THERAPY/EXERCISES: CPT

## 2022-03-04 NOTE — FLOWSHEET NOTE
[address] [place] [address] Outpatient Physical Therapy   [address] [facility] [address]   Phone: [phone]   Fax: [phone]    Physical Therapy Daily Treatment Note    Date:  3/4/2022  Patient Name:  Betsey Palumbo    :  1975  MRN: [id number]  Physician: Jonas Hayes MD                       Medical Diagnosis: S39.012D (ICD-10-CM) - Strain of muscle, fascia and tendon of lower back, subsequent encounter, S16. 1XXD (ICD-10-CM) - Strain of muscle, fascia and tendon at neck level, subsequent encounter,S50.311D (ICD-10-CM) - Abrasion of right elbow, subsequent encounter, S63.602D (ICD-10-CM) - Unspecified sprain of left thumb, subsequent encounter,S30. 0XXD (ICD-10-CM) - Contusion of lower back and pelvis, subsequent encounter   Clinical Diagnosis: Sacral pain with mobility deficits and (L) thumb pain  Onset date: 02/10/2022                  Next 's appt.: 2022  PT Visit Information  PT Insurance Information: Worker's Comp  Total # of Visits Approved: 10  Total # of Visits to Date: 3  No Show: 0  Canceled Appointment: 0    Subjective:  Pt reports increased soreness and pain after initial aquatic therapy visit. Notes having to take pain meds yesterday due to increased pain but back to \"baseline\" this morning.       Pain  Pain 1:  [x] Yes   [] No               Location: the entire sacral bone  Pain Rating: (0-10 scale)  5/10 without pain medication     Symptoms:  [x] Improving   [] Worsening                 [] Same  Descriptors: Constant, sharp and aching   Aggravating factors: sitting, standing and walking   Relieving factors: rest and repositioning, ice/heat  Sleep: Disturbed   Other:      Pain 2:  [x] Yes   [] No               Location: Left Wrist  Pain Rating: (0-10 scale) 5/10 constant    Symptoms:  [x] Improving   [] Worsening                 [] Same  Descriptors: achy- shooting pain up to elbow intermittentlu  Aggravating factors: movement especially with gripping/holding objects  Relieving factors: H2O 1 Noodle 1 Noodle      Cycling 1' 1'      [facility] 3' 3'              Cool Down        Pain Rating 5-7 5          Specific Instructions for next treatment: Add shapes w/ ball and knee flex/ext exercises next visit. Assessment: [x] Progressing toward goals. Continued with emphasis on core stability and pain free ranges. Notes some discomfort with squats but tolerable. Also difficulty pushing down on kickboard for Iso abs exercise but able to modify to complete exercise. Some discomfort in lumbar back and left thumb when performing UE format in chest deep water. Ended with deep water hang for pain relief. [] No change. [] Other:    [] Patient would continue to benefit from skilled physical therapy services in order to:     Goals  STG: (to be met in 10  treatments)  1. Pt will report an average pain level of       within the lumbar region at rest/ADLs. 2. Pt will demonstrate improved ROM within all involved planes to assist with (I) function. 3. Pt will demonstrate knowledge of fall prevention. LTG: (to be met in  treatments)  1. Pt will report an average pain level of       within the lumbar region at rest/ADLs. 2. Modified Oswestry Low back Pain Disability Index improved by 10% (MDC)  3. Pt will demonstrate independence with his home exercise program.      Patient goals: To feel better    Pt. Education:  [] Yes  [x] No  [] Reviewed Prior HEP/Ed  Method of Education: [] Verbal  [] Demo  [] Written  Comprehension of Education:  [] Verbalizes understanding. [] Demonstrates understanding. [] Needs review. [] Demonstrates/verbalizes HEP/Ed previously given. Plan: [x] Continue per plan of care.    [] Other:      Treatment Charges: Mins Units Time In/Time Out   []  Modalities      []  Ther Exercise      []  Manual Therapy      []  Ther Activities      [x]  Aquatics 30 2 7131-2930   []  Neuromuscular      [] Vasocompression      [] Gait Training      [] Dry needling        [] 1 or 2 muscles        [] 3 or more muscles      []  Other      Total Treatment time 30 2 0911-3212     Time In:  9074 AM           Time Out: 4389 AM    Electronically signed by:  Payton Thorpe PTA

## 2022-03-08 ENCOUNTER — HOSPITAL ENCOUNTER (OUTPATIENT)
Dept: PHYSICAL THERAPY | Age: 47
Setting detail: THERAPIES SERIES
Discharge: HOME | End: 2022-03-08
Payer: COMMERCIAL

## 2022-03-08 PROCEDURE — 97113 AQUATIC THERAPY/EXERCISES: CPT

## 2022-03-08 NOTE — FLOWSHEET NOTE
[address] [place] [address] Outpatient Physical Therapy   [address] [facility] [address]   Phone: [phone]   Fax: [phone]    Physical Therapy Daily Treatment Note    Date:  3/8/2022  Patient Name:  Neda Dotson    :  1975  MRN: [id number]  Physician: Rachna Mcdaniel MD                       Medical Diagnosis: S39.012D (ICD-10-CM) - Strain of muscle, fascia and tendon of lower back, subsequent encounter, S16. 1XXD (ICD-10-CM) - Strain of muscle, fascia and tendon at neck level, subsequent encounter,S50.311D (ICD-10-CM) - Abrasion of right elbow, subsequent encounter, S63.602D (ICD-10-CM) - Unspecified sprain of left thumb, subsequent encounter,S30. 0XXD (ICD-10-CM) - Contusion of lower back and pelvis, subsequent encounter   Clinical Diagnosis: Sacral pain with mobility deficits and (L) thumb pain  Onset date: 02/10/2022                  Next Dr's appt.: 2022  PT Visit Information  PT Insurance Information: Worker's Comp  Total # of Visits Approved: 10  Total # of Visits to Date: 4  No Show: 0  Canceled Appointment: 0    Subjective:  Soreness after last visit. To see dr this Friday. States her pain is some better since starting aquatic therapy but relief does not last long. Feels pain is higher today than usual with tailbone. Tried lifting her laundry basket yesterday at waist level and tailbone pain increased to 10/10 and she dropped basket. Patient reports she is still unable to  or apply pressure to left thumb.     Pain  Pain 1:  [x] Yes   [] No               Location: the entire sacral bone  Pain Rating: (0-10 scale)  8/10 without pain medication     Symptoms:  [x] Improving   [] Worsening                 [] Same  Descriptors: Constant, sharp and aching   Aggravating factors: sitting, standing and walking   Relieving factors: rest and repositioning, ice/heat  Sleep: Disturbed   Other:      Pain 2:  [x] Yes   [] No               Location: Left Wrist  Pain Rating: (0-10 scale) 6/10 constant    Symptoms:  [x] Improving   [] Worsening                 [] Same  Descriptors: achy- shooting pain up to elbow intermittentlu  Aggravating factors: movement especially with gripping/holding objects  Relieving factors: rest, ice/heat, brace  Sleep: No issue     Comments: reviewed emphasis on core stability, mobility, pain relief and postural awareness. [address] [facility] Exercise Log  Protocol Fibromyalgia    Date of Eval: 03/02/2022                               Primary PT: Mouna Alejandro PT, DPT   Diagnosis: S39.012D (ICD-10-CM) - Strain of muscle, fascia and tendon of lower back, subsequent encounter, S16. 1XXD (ICD-10-CM) - Strain of muscle, fascia and tendon at neck level, subsequent encounter,S50.311D (ICD-10-CM) - Abrasion of right elbow, subsequent encounter, S63.602D (ICD-10-CM) - Unspecified sprain of left thumb, subsequent encounter,S30. 0XXD (ICD-10-CM) - Contusion of lower back and pelvis, subsequent encounter   Things to Focus On (goals): Pain control, motion   Surgical Precautions:  Medical Precautions: Prolonged emergence from general anesthesia; Pneumonia; Abdominal bloating; Uterine fibroid; Vagina bleeding; Fibromyalgia; History of chickenpox  [x] C-9 dates- PRESUMPTIVE AUTH 3X/WK FOR 8 VISITS  [] Occ Med   [] Medicare     Name: Cordell Keating  Date 3/2/22 3/4/22 3/8/22     Visit # 2/10 3/10 4/10     Walk F/L/R 2 Laps 2 Laps 2 Laps     Marching 10x 10x 10x Add lap    LE Exercise        Squats 10x3\" 10x3\" 10x5\"     Step-Ups F/L        Heel-toe raises   10x     SLR F/L/R 10x 10x 10x     Lunges        Knee flex/ext    Add            UE exercises        Horiz abd/add 10x 10x 10x     Alt Flex/Ext 10x 10x 10x     Abd/add 10x 10x 10x     PNF        Bicep Curls 10x 10x 10x     IR/ER 10x 10x 10x     Wall Push-Ups                Kickboard Ex. Sm Small     Iso Abd.  10x5\" 10x5\"     Push-pull    ? Add    Paddling                [employer]  5x each  cw/ccw UE Stretches        Corner stretch        Post. Capsule stretch                LE Stretches        Hamstring  1x20\" 2x20\"   1st step     Achilles        Quad                        Deep H2O 1 Noodle 1 Noodle 1 Noodle     Cycling 1' 1' 1'     Jacks   1'     Cross-country        Hang 3' 3' 3'             Cool Down   1 Lap Breast Stroke     Pain Rating 5-7 5 7         Specific Instructions for next treatment: Add marching lap and attempt kickboard push pull to challenge core stability    Assessment: [x] Progressing toward goals. Progressed program with breast stroke lap, ball shapes and deep water hip abd/add without issue. Patient continues with difficulty during kickboard push down- modified for patient/hand on hand as patient reports intolerance to pressing down with left thumb due to pain. Continued modified hamstring stretch at first step due to sacral pain L LE > R LE. Finished with deep water hang to unload spine With some relief noted   [] No change. [] Other:  Patient would continue to benefit from skilled physical therapy services that includes aquatic therapy for improved motion and pain control.        Goals  LTG: (to be met in 10 treatments)  1. No complaints of pain will be reported within the (L) thumb or sacral region at rest/ADLs. 2. Modified Oswestry Low back Pain Disability Index improved by 10% (MDC)  3. Quick DASH improved by 10 points. 3. Pt will demonstrate independence with her completed home exercise program at discharge.      Patient goals: To feel better    Pt. Education:  [x] Yes  [] No  [] Reviewed Prior HEP/Ed  Method of Education: [x] Verbal  [x] Demo  [] Written  Comprehension of Education: Postural awareness/body mechanics  [] Verbalizes understanding. [] Demonstrates understanding. [] Needs review. [] Demonstrates/verbalizes HEP/Ed previously given. Plan: [x] Continue per plan of care.    [] Other:      Treatment Charges: Mins Units Time In/Time Out   []  Modalities []  Ther Exercise      []  Manual Therapy      []  Ther Activities      [x]  Aquatics 35 2 155 PM-230 PM   []  Neuromuscular      [] Vasocompression      [] Gait Training      [] Dry needling        [] 1 or 2 muscles        [] 3 or more muscles      []  Other      Total Treatment time 35 2      Time In:  155 PM           Time Out: 235 PM    Electronically signed by:  Piotr Levy PTA

## 2022-03-10 ENCOUNTER — HOSPITAL ENCOUNTER (OUTPATIENT)
Dept: PHYSICAL THERAPY | Age: 47
Setting detail: THERAPIES SERIES
Discharge: HOME | End: 2022-03-10
Payer: COMMERCIAL

## 2022-03-10 PROCEDURE — 97113 AQUATIC THERAPY/EXERCISES: CPT

## 2022-03-10 NOTE — FLOWSHEET NOTE
[address] [facility] Outpatient Physical Therapy   [address] [place] [address] [address]   Phone: [phone]   Fax: [phone]    Physical Therapy Daily Treatment Note    Date:  3/10/2022  Patient Name:  Sara Saini    :  1975  MRN: [id number]  Physician: Jenny Pathak MD                       Medical Diagnosis: S39.012D (ICD-10-CM) - Strain of muscle, fascia and tendon of lower back, subsequent encounter, S16. 1XXD (ICD-10-CM) - Strain of muscle, fascia and tendon at neck level, subsequent encounter,S50.311D (ICD-10-CM) - Abrasion of right elbow, subsequent encounter, S63.602D (ICD-10-CM) - Unspecified sprain of left thumb, subsequent encounter,S30. 0XXD (ICD-10-CM) - Contusion of lower back and pelvis, subsequent encounter   Clinical Diagnosis: Sacral pain with mobility deficits and (L) thumb pain  Onset date: 02/10/2022                  Next Dr's appt.: 2022  PT Visit Information  PT Insurance Information: Worker's Comp  Total # of Visits Approved: 10  Total # of Visits to Date: 5  No Show: 0  Canceled Appointment: 0    Subjective:  Pt reports no increased pain after last visit. States she feels it is helping just taking a while. States sacrum pain increases with squatting and any lifting of objects.     Pain  Pain 1:  [x] Yes   [] No               Location: the entire sacral bone  Pain Rating: (0-10 scale)  5/10 without pain medication     Symptoms:  [x] Improving   [] Worsening                 [] Same  Descriptors: Constant, sharp and aching   Aggravating factors: sitting, standing and walking   Relieving factors: rest and repositioning, ice/heat  Sleep: Disturbed   Other:      Pain 2:  [x] Yes   [] No               Location: Left Wrist  Pain Rating: (0-10 scale) 6/10 constant    Symptoms:  [x] Improving   [] Worsening                 [] Same  Descriptors: achy- shooting pain up to elbow intermittentlu  Aggravating factors: movement especially with gripping/holding objects  Relieving factors: rest, ice/heat, brace  Sleep: No issue     Comments: reviewed emphasis on core stability, mobility, pain relief and postural awareness. [address] [facility] Exercise Log  Protocol Fibromyalgia    Date of Eval: 03/02/2022                               Primary PT: Rober Strong PT, DPT   Diagnosis: S39.012D (ICD-10-CM) - Strain of muscle, fascia and tendon of lower back, subsequent encounter, S16. 1XXD (ICD-10-CM) - Strain of muscle, fascia and tendon at neck level, subsequent encounter,S50.311D (ICD-10-CM) - Abrasion of right elbow, subsequent encounter, S63.602D (ICD-10-CM) - Unspecified sprain of left thumb, subsequent encounter,S30. 0XXD (ICD-10-CM) - Contusion of lower back and pelvis, subsequent encounter   Things to Focus On (goals): Pain control, motion   Surgical Precautions:  Medical Precautions: Prolonged emergence from general anesthesia; Pneumonia; Abdominal bloating; Uterine fibroid; Vagina bleeding; Fibromyalgia; History of chickenpox  [x] C-9 dates- PRESUMPTIVE AUTH 3X/WK FOR 8 VISITS  [] Occ Med   [] Medicare     Name: Sam Harp  Date 3/2/22 3/4/22 3/8/22 3/10/22    Visit # 2/10 3/10 4/10 5/10    Walk F/L/R 2 Laps 2 Laps 2 Laps 2 Laps    Marching 10x 10x 10x 2 Laps    LE Exercise        Squats 10x3\" 10x3\" 10x5\" 10x5\"    Step-Ups F/L        Heel-toe raises   10x 10x    SLR F/L/R 10x 10x 10x 10x    Lunges        Knee flex/ext    10x            UE exercises        Horiz abd/add 10x 10x 10x 10x    Alt Flex/Ext 10x 10x 10x 10x    Abd/add 10x 10x 10x 10x    PNF        Bicep Curls 10x 10x 10x 10x    IR/ER 10x 10x 10x 10x    Wall Push-Ups                Kickboard Ex.   Sm Small Small    Iso Abd.  10x5\" 10x5\" 10x5\"    Push-pull    Add Add   Paddling                Ball Shapes   Small Ball  5x each  cw/ccw Small Ball   5x each cw/ccw            UE Stretches        Corner stretch        Post. Capsule stretch                LE Stretches        Hamstring  1x20\" 2x20\"   1st step 2x20\"  1st step Achilles        Quad                        Deep H2O 1 Noodle 1 Noodle 1 Noodle 1 Noodle    Cycling 1' 1' 1' 1'    Jacks   1' 30\"    Cross-country        Hang 3' 3' 3' 3'            Cool Down   1 Lap Breast Stroke 2 Lap Breast stroke    Pain Rating 5-7 5 7 5        Specific Instructions for next treatment: attempt kick board push-pull and increase reps to patient's tolerance next visit. Assessment: [x] Progressing toward goals. Added marching laps for dynamic core stability and hip/knee flex/ext exercises working in hip ROM and core stability. Notes increased aggravation in left lumbar back/sacrum area with marching laps. Decreased height to pain free ranges. More rest breaks required when performing other LE exercises due to left sacrum. Deep water hang did not provide relief today like usual.      [] No change. [] Other:  Patient would continue to benefit from skilled physical therapy services that includes aquatic therapy for improved motion and pain control. Goals  LTG: (to be met in 10 treatments)  1. No complaints of pain will be reported within the (L) thumb or sacral region at rest/ADLs. 2. Modified Oswestry Low back Pain Disability Index improved by 10% (MDC)  3. Quick DASH improved by 10 points. 3. Pt will demonstrate independence with her completed home exercise program at discharge. Patient goals: To feel better    Pt. Education:  [] Yes  [x] No  [] Reviewed Prior HEP/Ed  Method of Education: [] Verbal  [] Demo  [] Written  Comprehension of Education: Postural awareness/body mechanics  [] Verbalizes understanding. [] Demonstrates understanding. [] Needs review. [] Demonstrates/verbalizes HEP/Ed previously given. Plan: [x] Continue per plan of care.    [] Other:      Treatment Charges: Mins Units Time In/Time Out   []  Modalities      []  Ther Exercise      []  Manual Therapy      []  Ther Activities      [x]  Aquatics 39 4 0780-0449   []  Neuromuscular      [] Vasocompression      [] Gait Training      [] Dry needling        [] 1 or 2 muscles        [] 3 or more muscles      []  Other      Total Treatment time 39 3 2258-0446     3 minute deep water hang not billable time.     Time In:  9372           Time Out: 1036    Electronically signed by:  Faith Pinzon PTA

## 2022-03-11 ENCOUNTER — HOSPITAL ENCOUNTER (OUTPATIENT)
Dept: PHYSICAL THERAPY | Age: 47
Setting detail: THERAPIES SERIES
Discharge: HOME | End: 2022-03-11
Payer: COMMERCIAL

## 2022-03-11 PROCEDURE — 97113 AQUATIC THERAPY/EXERCISES: CPT

## 2022-03-11 NOTE — FLOWSHEET NOTE
[address] [place] [address] Outpatient Physical Therapy   [address] [facility] [address]   Phone: [phone]   Fax: [phone]    Physical Therapy Daily Treatment Note    Date:  3/11/2022  Patient Name:  Hemant Sanford    :  1975  MRN: [id number]  Physician: David Wilson MD                       Medical Diagnosis: S39.012D (ICD-10-CM) - Strain of muscle, fascia and tendon of lower back, subsequent encounter, S16. 1XXD (ICD-10-CM) - Strain of muscle, fascia and tendon at neck level, subsequent encounter,S50.311D (ICD-10-CM) - Abrasion of right elbow, subsequent encounter, S63.602D (ICD-10-CM) - Unspecified sprain of left thumb, subsequent encounter,S30. 0XXD (ICD-10-CM) - Contusion of lower back and pelvis, subsequent encounter   Clinical Diagnosis: Sacral pain with mobility deficits and (L) thumb pain  Onset date: 02/10/2022                  Next Dr's appt.: 2022  PT Visit Information  PT Insurance Information: Worker's Comp  Total # of Visits Approved: 10  Total # of Visits to Date: 6  No Show: 0  Canceled Appointment: 0    Subjective:  Pt reports increased pain since last visit. States pain is in left SI joint/buttocks area. Notes taking naproxen and Ibuprofen to help with pain the last two days. Notes some relief, \"takes the edge off\" but does not like taking them. States she had difficulty sleeping last night due to pain. No real change in thumb/wrist.  L thumb is painful when applying pressure to it.     Pain  Pain 1:  [x] Yes   [] No               Location: the entire sacral bone  Pain Rating: (0-10 scale)  7/10 without pain medication     Symptoms:  [x] Improving   [] Worsening                 [] Same  Descriptors: Constant, sharp and aching   Aggravating factors: sitting, standing and walking   Relieving factors: rest and repositioning, ice/heat  Sleep: Disturbed   Other:      Pain 2:  [x] Yes   [] No               Location: Left Wrist  Pain Rating: (0-10 scale) 6/10 constant    Symptoms:  [x] Improving   [] Worsening                 [] Same  Descriptors: achy- shooting pain up to elbow intermittentlu  Aggravating factors: movement especially with gripping/holding objects  Relieving factors: rest, ice/heat, brace  Sleep: No issue     Comments: reviewed emphasis on core stability, mobility, pain relief and postural awareness. [address] [facility] Exercise Log  Protocol Fibromyalgia    Date of Eval: 03/02/2022                               Primary PT: Faby Gia PT, DPT   Diagnosis: S39.012D (ICD-10-CM) - Strain of muscle, fascia and tendon of lower back, subsequent encounter, S16. 1XXD (ICD-10-CM) - Strain of muscle, fascia and tendon at neck level, subsequent encounter,S50.311D (ICD-10-CM) - Abrasion of right elbow, subsequent encounter, S63.602D (ICD-10-CM) - Unspecified sprain of left thumb, subsequent encounter,S30. 0XXD (ICD-10-CM) - Contusion of lower back and pelvis, subsequent encounter   Things to Focus On (goals): Pain control, motion   Surgical Precautions:  Medical Precautions: Prolonged emergence from general anesthesia; Pneumonia; Abdominal bloating; Uterine fibroid; Vagina bleeding; Fibromyalgia; History of chickenpox  [x] C-9 dates- PRESUMPTIVE AUTH 3X/WK FOR 8 VISITS  [] Occ Med   [] Medicare     Name: Jay Atkins  Date 3/2/22 3/4/22 3/8/22 3/10/22 3/11/22   Visit # 2/10 3/10 4/10 5/10 6/10   Walk F/L/R 2 Laps 2 Laps 2 Laps 2 Laps 2 Laps   Marching 10x 10x 10x 2 Laps 10x   LE Exercise        Squats 10x3\" 10x3\" 10x5\" 10x5\" 15x5\"   Step-Ups F/L        Heel-toe raises   10x 10x 10x   SLR F/L/R 10x 10x 10x 10x 10x   Lunges        Knee flex/ext    10x 10x           UE exercises        Horiz abd/add 10x 10x 10x 10x 10x   Alt Flex/Ext 10x 10x 10x 10x 10x   Abd/add 10x 10x 10x 10x 10x   PNF        Bicep Curls 10x 10x 10x 10x 10x   IR/ER 10x 10x 10x 10x 10x   Wall Push-Ups                Kickboard Ex.   Sm Small Small Small   Iso Abd.  10x5\" 10x5\" 10x5\" 10x5\"   Push-pull Add    Paddling                [employer]  5x each  cw/ccw Small Ball   5x each cw/ccw Small ball   5x each cw/ccw           UE Stretches        Corner stretch        Post. Capsule stretch                LE Stretches        Hamstring  1x20\" 2x20\"   1st step 2x20\"  1st step    Achilles        Quad                        Deep H2O 1 Noodle 1 Noodle 1 Noodle 1 Noodle    Cycling 1' 1' 1' 1'    Jacks   1' 30\"    Cross-country        Hang 3' 3' 3' 3'            Cool Down   1 Lap Breast Stroke 2 Lap Breast stroke    Pain Rating 5-7 5 7 5        Specific Instructions for next treatment: attempt kick board push-pull and increase reps to patient's tolerance next visit. Assessment: [] Progressing toward goals. [] No change. [x] Other:  Began treatment with 5' deep water hang to reduce pain and muscle guarding to allow for better AROM during treatment. Continued with discomfort in left lumbar back during treatment even with cueing to avoid pain ranges. Able too complete gentle stretching today and all exercises besides squats- only completed x5 reps. Ended with deep water hang. Patient notes slight decrease in pain from beginning of treatment. Patient would continue to benefit from skilled physical therapy services that includes aquatic therapy for improved motion and pain control. Goals  LTG: (to be met in 10 treatments)  1. No complaints of pain will be reported within the (L) thumb or sacral region at rest/ADLs. 2. Modified Oswestry Low back Pain Disability Index improved by 10% (MDC)  3. Quick DASH improved by 10 points. 3. Pt will demonstrate independence with her completed home exercise program at discharge. Patient goals: To feel better    Pt. Education:  [] Yes  [x] No  [] Reviewed Prior HEP/Ed  Method of Education: [] Verbal  [] Demo  [] Written  Comprehension of Education: Postural awareness/body mechanics  [] Verbalizes understanding.   [] Demonstrates understanding. [] Needs review. [] Demonstrates/verbalizes HEP/Ed previously given. Plan: [x] Continue per plan of care. [] Other:      Treatment Charges: Mins Units Time In/Time Out   []  Modalities      []  Ther Exercise      []  Manual Therapy      []  Ther Activities      [x]  Aquatics 40 3 2900-4303   []  Neuromuscular      [] Vasocompression      [] Gait Training      [] Dry needling        [] 1 or 2 muscles        [] 3 or more muscles      []  Other      Total Treatment time 40 3 1169-9998     .     Time In: 1025            Time Out: 1110    Electronically signed by:  Maria Luisa Garcia PTA

## 2022-03-14 ENCOUNTER — HOSPITAL ENCOUNTER (OUTPATIENT)
Dept: PHYSICAL THERAPY | Age: 47
Setting detail: THERAPIES SERIES
Discharge: HOME | End: 2022-03-14
Payer: COMMERCIAL

## 2022-03-14 PROCEDURE — 97113 AQUATIC THERAPY/EXERCISES: CPT

## 2022-03-14 PROCEDURE — 97110 THERAPEUTIC EXERCISES: CPT

## 2022-03-14 NOTE — FLOWSHEET NOTE
[address] [place] [address] Outpatient Physical Therapy   [address] [facility] [address]   Phone: [phone]   Fax: [phone]    Physical Therapy Daily Treatment Note    Date:  3/14/2022  Patient Name:  Sandra Vargas    :  1975  MRN: [id number]  Physician: Claire Coyne MD                       Medical Diagnosis: S39.012D (ICD-10-CM) - Strain of muscle, fascia and tendon of lower back, subsequent encounter, S16. 1XXD (ICD-10-CM) - Strain of muscle, fascia and tendon at neck level, subsequent encounter,S50.311D (ICD-10-CM) - Abrasion of right elbow, subsequent encounter, S63.602D (ICD-10-CM) - Unspecified sprain of left thumb, subsequent encounter,S30. 0XXD (ICD-10-CM) - Contusion of lower back and pelvis, subsequent encounter   Clinical Diagnosis: Sacral pain with mobility deficits and (L) thumb pain  Onset date: 02/10/2022                  Next 's appt.: 2022  PT Visit Information  PT Insurance Information: Worker's Comp  Total # of Visits Approved: 10  Total # of Visits to Date: 7  No Show: 0  Canceled Appointment: 0    Subjective:  Pt reports increased pain after 2 therapy days in a row last week. States still not going back to work. States she is starting ibuprofen 600mg for pain and inflammation.       Pain  Pain 1:  [x] Yes   [] No               Location: the entire sacral bone  Pain Rating: (0-10 scale)  4.5/10 without pain medication     Symptoms:  [x] Improving   [] Worsening                 [] Same  Descriptors: Constant, sharp and aching   Aggravating factors: sitting, standing and walking   Relieving factors: rest and repositioning, ice/heat  Sleep: Disturbed   Other:      Pain 2:  [x] Yes   [] No               Location: Left Wrist  Pain Rating: (0-10 scale) 6/10 constant    Symptoms:  [x] Improving   [] Worsening                 [] Same  Descriptors: achy- shooting pain up to elbow intermittentlu  Aggravating factors: movement especially with gripping/holding objects  Relieving factors: rest, ice/heat, brace  Sleep: No issue     Comments: reviewed emphasis on core stability, mobility, pain relief and postural awareness. [address] [facility] Exercise Log  Protocol Fibromyalgia    Date of Eval: 03/02/2022                               Primary PT: Bela Courser PT, DPT   Diagnosis: S39.012D (ICD-10-CM) - Strain of muscle, fascia and tendon of lower back, subsequent encounter, S16. 1XXD (ICD-10-CM) - Strain of muscle, fascia and tendon at neck level, subsequent encounter,S50.311D (ICD-10-CM) - Abrasion of right elbow, subsequent encounter, S63.602D (ICD-10-CM) - Unspecified sprain of left thumb, subsequent encounter,S30. 0XXD (ICD-10-CM) - Contusion of lower back and pelvis, subsequent encounter   Things to Focus On (goals): Pain control, motion   Surgical Precautions:  Medical Precautions: Prolonged emergence from general anesthesia; Pneumonia; Abdominal bloating; Uterine fibroid; Vagina bleeding; Fibromyalgia; History of chickenpox  [x] C-9 dates- PRESUMPTIVE AUTH 3X/WK FOR 8 VISITS  [] Occ Med   [] Medicare     Name: David Mcmahon  Date 3/10/22 3/11/22 3/14/22   Visit # 5/10 6/10 7/10   Walk F/L/R 2 Laps 2 Laps 2 Laps   Marching 2 Laps 10x 12x   LE Exercise      Squats 10x5\" 15x5\" 12x5\"   Step-Ups F/L      Heel-toe raises 10x 10x 12x   SLR F/L/R 10x 10x 12x   Lunges      Knee flex/ext 10x 10x 12x         UE exercises      Horiz abd/add 10x 10x 12x   Alt Flex/Ext 10x 10x 12x   Abd/add 10x 10x 12x   PNF      Bicep Curls 10x 10x 12x   IR/ER 10x 10x 12x   Wall Push-Ups            Kickboard Ex.  Small Small Small   Iso Abd. 10x5\" 10x5\" 10x5\"   Push-pull Add     Paddling            Ball Shapes   3 shapes Small Ball   5x each cw/ccw Small ball   5x each cw/ccw Small ball  12x ea dir  cw/ccw         UE Stretches      Corner stretch      Post. Capsule stretch            LE Stretches      Hamstring 2x20\"  1st step  2x20\"  L 1st step   Achilles      Quad                  Deep H2O 1 Noodle 1 Noodle 1 Noodle   Cycling 1'  1'   Jacks 30\"     Cross-country      Hang 3' 5' 5'         Cool Down 2 Lap Breast stroke 2 Laps Breast stroke 2 Laps Breast stroke   Pain Rating 5 8 4.5       Specific Instructions for next treatment: attempt kick board push-pull and step ups next visit. Assessment: [x] Progressing toward goals. Increased reps with all UE and LE exercises with minimal increased pain. Notes some discomfort with hamstring stretch on left LE. Ended with deep water hang for pain relief. Notes decreased pain after deep water hang today. [] No change. [] Other:     Patient would continue to benefit from skilled physical therapy services that includes aquatic therapy for improved motion and pain control. Goals  LTG: (to be met in 10 treatments)  1. No complaints of pain will be reported within the (L) thumb or sacral region at rest/ADLs. 2. Modified Oswestry Low back Pain Disability Index improved by 10% (MDC)  3. Quick DASH improved by 10 points. 3. Pt will demonstrate independence with her completed home exercise program at discharge. Patient goals: To feel better    Pt. Education:  [] Yes  [x] No  [] Reviewed Prior HEP/Ed  Method of Education: [] Verbal  [] Demo  [] Written  Comprehension of Education: Postural awareness/body mechanics  [] Verbalizes understanding. [] Demonstrates understanding. [] Needs review. [] Demonstrates/verbalizes HEP/Ed previously given. Plan: [x] Continue per plan of care. [] Other:      Physical therapy treatment completed today in part by physical therapist assistant (PTA).    Treatment Charges: Mins Units Time In/Time Out   []  Modalities      [x]  Ther Exercise 15 1 5717-6040   []  Manual Therapy      []  Ther Activities      [x]  Aquatics 40 3 5023-7084   []  Neuromuscular      [] Vasocompression      [] Gait Training      [] Dry needling        [] 1 or 2 muscles        [] 3 or more muscles      [x]  Other: Re-Evaluation 15 0 1067-8746 Total Treatment time 40 3 1702-1561   Treatment times reflect total treatment time combined by both PT and PTA for today's service. 5 minute deep water hang un-billable.     Time In: 1030          Time Out: 1155    Electronically signed by:  Boom Acosta PTA

## 2022-03-14 NOTE — PROGRESS NOTES
[address] [facility] Outpatient Physical Therapy  [address] [place] [address]. [address]         Phone: [phone]       Fax: [phone]    Physical Therapy Progress Note Update    Date:  3/14/2022  Patient: Stevie Wade  : 1975  MRN: [id number]  Physician: Antonia Alston MD    Medical Diagnosis: S39.012D (ICD-10-CM) - Strain of muscle, fascia and tendon of lower back, subsequent encounter, S16. 1XXD (ICD-10-CM) - Strain of muscle, fascia and tendon at neck level, subsequent encounter,S50.311D (ICD-10-CM) - Abrasion of right elbow, subsequent encounter, S63.602D (ICD-10-CM) - Unspecified sprain of left thumb, subsequent encounter,S30. 0XXD (ICD-10-CM) - Contusion of lower back and pelvis, subsequent encounter   Clinical Diagnosis: Sacral pain with mobility deficits/(L) thumb pain     Onset date: 02/10/2022    Next Dr's appt.: 2022  PT Visit Information  PT Insurance Information: Worker's Comp  Total # of Visits Approved: 10  Total # of Visits to Date: 7  No Show: 0  Canceled Appointment: 0     Subjective  Patient stated that she saw Bautista Ryan on 2022. She stated that he still has her off work/been off since the incident. She further stated that he provided her a prescription of Motrin 600 mg/appears to be helping her symptoms compared to Naproxen/no longer taking. Overall, she feels she is moving better with regards to her trunk motions. She is now able to sit with more pressure on her buttocks.      Pain  Pain:  [x] Yes   [] No      Location: the entire sacral bone  Pain Rating: (0-10 scale)  5/10 with pain medication   Worst: 5/10 with pain medication   Best: 5/10 with pain medication   Symptoms:  [] Improving [] Worsening       [x] Same  Descriptors: Constant, sharp and aching   Aggravating factors: sitting, standing and walking   Relieving factors: rest and repositioning   Sleep: Disturbed   Other:     Pain  Pain:  [x] Yes   [] No      Location: proximal region of the (L) thumb to the joint line of the wrist   Pain Rating: (0-10 scale) 4/10 with  pain medication   Worst: 5/10    Best: 4/10   Symptoms:  [x] Improving [] Worsening       [] Same  Descriptors: Constant, achy   Aggravating factors: ADLs involving the (L) wrist/hand   Relieving factors: rest, repositioning   Sleep: Disturbed   Other:     Function  Hand Dominance  [x] Right  [] Left  Working:  [] Normal Duty  [] Light Duty  [x] Off D/T Condition  [] Retired    [] Not Employed    []  Disability  [] Other:           Return to work:   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)  Restrictions - no lifting greater than 5-10 lbs, bending, no prolonged periods of standing, walking, sitting, no pushing/pulling     ADL/IADL Previous level of function Current level of function Who currently assists the patient with task/Comments   Bathing  [x] Independent  [] Assist [x] Independent  [] Assist    Dress/grooming [x] Independent  [] Assist [x] Independent  [] Assist    Transfer/mobility [x] Independent  [] Assist [x] Independent  [] Assist    Feeding [x] Independent  [] Assist [x] Independent  [] Assist    Toileting [x] Independent  [] Assist [x] Independent  [] Assist    Driving [x] Independent  [] Assist [x] Independent  [] Assist    Housekeeping [x] Independent  [] Assist [] Independent  [x] Assist    Grocery shop/meal prep [x] Independent  [] Assist [] Independent  [x] Assist      Gait Prior level of function Current level of function    [x] Independent  [] Assist [x] Independent  [] Assist   Device: [x] Independent [x] Independent    [] Straight Cane [] Quad cane [] Straight Cane [] Quad cane    [] Standard walker [] Rolling walker   [] 4 wheeled walker [] Standard walker [] Rolling walker   [] 4 wheeled walker    [] Wheelchair [] Wheelchair     Objective  Observation/Palpation   Normal Deficit Comments   Observation [] [x] Able to sit on both buttocks.  However, more weight on the (R) than (L)  Equal weight bearing on both LEs now    Posture [] []    Palpation [] [x] No tenderness to the touch within the sacral region. Tenderness to the touch was no longer noted  at the base of the (L) thumb/wrist joint    Edema [] []    Scar [] []    Other [] []      Range of Motion  Spine - Range of Motion  Cervical   WNL   Thoracic  WNL in all planes   Lumbar  Flexion - mid  patella with pain @ end range and aberrant motion upon returning, Extension - WNL with pain @ end range, (R) side bend - WNL, (L) side bend - WNL     Right Lower Extremity - Active ROM  Hip flexion  WNL   Hip extension  WNL   Hip abduction  WNL   Hip adduction  WNL   Hip ER  WNL   Hip IR  WNL   Knee flexion  WNL   Knee extension  WNL   WNL   Dorsiflexion  WNL   Plantarflexion  WNL     Left Lower Extremity - Active ROM  Hip flexion  WNL   Hip extension  WNL   Hip abduction  WNL   Hip adduction  WNL   Hip ER  WNL   Hip IR  WNL   Knee flexion  WNL   Knee extension  WNL   Dorsiflexion  WNL   Plantarflexion  WNL     Strength  Spine Normal Deficit Comments   Anterior chain   [] [] Supine Hook lying Posterior Pelvic Tilt - immediate pain with contraction.     Posterior chain   [] [] Prone press up with pain @ end range    Lateral chain   [] [x] (R) hip abduction - 3/5 MMT with pain @ end range  (L) hip abduction -2+/5 MMT with pain @ end range      Right Lower Extremity - Strength  Hip flexion  5/5 MMT   Hip extension  5/5 MMT   Hip abduction  5/5 MMT   Hip adduction  5/5 MMT   Hip ER  5/5 MMT   Hip IR  5/5 MMT   Knee flexion  5/5 MMT   Knee extension  5/5 MMT   Dorsiflexion  5/5 MMT   Plantarflexion  5/5 MMT     Left Lower Extremity - Strength  Hip flexion  5/5 MMT   Hip extension  5/5 MMT   Hip abduction  5/5 MMT   Hip adduction  5/5 MMT   Hip ER  5/5 MMT   Hip IR  5/5 MMT   Knee flexion  5/5 MMT   Knee extension  5/5 MMT   Dorsiflexion  5/5 MMT   Plantarflexion  5/5 MMT     Right Upper Extremity - Active Range of Motion    Shoulder Flexion  WNL    Shoulder Extension  WNL    Shoulder Abduction  WNL Shoulder ER  WNL    Shoulder IR  WNL    Elbow Flexion  WNL    Elbow Extension  WNL    Wrist Flexion  WNL    Wrist Extension  WNL    Wrist Ulnar Deviation  WNL    Wrist Radial Deviation  WNL      Left  Upper Extremity - Active Range of Motion    Shoulder Flexion   WNL    Shoulder Extension  WNL    Shoulder Abduction  WNL    Shoulder ER  WNL    Shoulder IR  WNL    Elbow Flexion  WNL    Elbow Extension  WNL    Wrist Flexion  WNL   Wrist Extension  WNL   Wrist Ulnar Deviation  WNL   Wrist Radial Deviation  WNL with pain @ end range     Right Upper Extremity - Strength  Shoulder Flexion   5/5 MMT   Shoulder Extension   5/5 MMT   Shoulder Abduction  5/5 MMT   Shoulder ER  5/5 MMT   Shoulder IR  5/5 MMT   Elbow Flexion  5/5 MMT   Elbow Extension  5/5 MMT   Wrist Flexion  5/5 MMT   Wrist Extension  5/5 MMT   Wrist Ulnar Deviation  5/5 MMT   Wrist Radial Deviation  5/5 MMT     Left  Upper Extremity - Strength  Shoulder Flexion   5/5 MMT   Shoulder Extension  5/5 MMT   Shoulder Abduction  5/5 MMT   Shoulder ER  5/5 MMT   Shoulder IR  5/5 MMT   Elbow Flexion  5/5 MMT   Elbow Extension  5/5 MMT   Wrist Flexion  3/5 MMT   Wrist Extension  3/5 MMT   Wrist Ulnar Deviation  3/5 MMT   Wrist Radial Deviation  3/5 MMT     Additional Measures    Normal Deficit Comments   Sensation   [] []    Reflexes   [] []    Gross motor   [] []    Flexibility    [] [x] (B) hamstring tightness was still  apparent    Special Tests   [] []     strength   [] []    Other   [] []        Assessment  Patient is improving as evidence by her self-reporting a reduction in pain within the (L) thumb/wrist. Sacral pain unchanged per NPRS. However, the patient demonstrated improved sitting and standing posture. Improved trunk flexion was observed but still limited with aberrant motion returning upright. Patient is now able to lay supine, prone and side lying. Assessed core strength and weakness was apparent throughout all three chains.      Goals  LTG: (to be met in 10 treatments)  1. No complaints of pain will be reported within the (L) thumb or sacral region at rest/ADLs. 2. Modified Oswestry Low back Pain Disability Index improved by 10% (MDC)  3. Quick DASH improved by 10 points. 4. Pt will demonstrate independence with her completed home exercise program at discharge. Patient goals: To feel better    Patient demonstrated improvement from condition with _0_ of _4__ long term goals     Comments/Function: Improved sitting and standing posture while performing her ADLs. Pt. Education:  [x] Plans/Goals, Risks/Benefits discussed  [x] Home exercise program  (L) hand full fist motion x 10 reps, (L) wrist flexion, extension, ulnar and radial deviation x 10 reps each within pain free range, Standing trunk flexion - 5 reps x 3 sets within a pain free range (Daily 1-2 sessions)   Method of Education: [x] Verbal  [x] Demo  [] Written  Comprehension of Education:  [] Verbalizes understanding. [] Demonstrates understanding. [] Needs Review. [x] Demonstrates/verbalizes understanding of HEP/Ed noted above. Recommendations: Continue with current prescription/aquatics to further address her physical impairments and activity limitations. [] Patient is Discharged At This Time Unless Further Orders Are Received. New Script Needed To Continue Treatment: [x] No   [] Y es  (visits left on current prescription:       3        ) Please sign orders at the bottom of this page and return by faxing. Thank you.      Current Treatment:  [] Therapeutic Exercise    [] Modalities                [] Work Conditioning  [] Therapeutic Activity    [] Ultrasound  [] Electrical Stimulation  [] Gait Training     [] Massage       [] Lumbar/Cervical Traction  [] Neuromuscular Re-education [] Cold/hotpack [] Iontophoresis: 4 mg/mL  [] Instruction in Home Exercise Program                     Dexamethasone Sodium  [] Manual Therapy             Phosphate 40-80 mAmin  [x] Aquatic Therapy [] Vaso Pneumatic compression  [] Other:  More objective information is available upon request.                      Physical Therapy Prescription:      [] Continue current Rx    [] Therapist Discretion    [] Rx as below  Recommended Changes to Treatment:  [] Heat/Cold  [] Stretching  [] Therapeutic Exercise  [] Reconditioning  [] Massage  [] Ultrasound  [] Electrical Stim  [] Iontophoresis: 40mg/ml Dexamethasone Sodium Phosphate 40-80 mAmin  [] Aquatics  [] Vasocompression  [] Other:  Frequency:          X/wk for          wks    Medicare/Regulatory Requirements:  I have reviewed this plan of care and certify a need for medically necessary rehabilitation services.   [] Physician Signature                                                   Date:       Thank you for your referral                 Electronically signed by: Mag Saunders PT DPT    [facility]) @ [facility]  [address] [place] [address] [address]  [address]  Phone [phone]  Fax [phone]

## 2022-03-16 ENCOUNTER — HOSPITAL ENCOUNTER (OUTPATIENT)
Dept: PHYSICAL THERAPY | Age: 47
Setting detail: THERAPIES SERIES
Discharge: HOME | End: 2022-03-16
Payer: COMMERCIAL

## 2022-03-16 PROCEDURE — 97113 AQUATIC THERAPY/EXERCISES: CPT

## 2022-03-16 NOTE — FLOWSHEET NOTE
[facility] Outpatient Physical Therapy   [address] [facility] [address]   Phone: [phone]   Fax: [phone]    Physical Therapy Daily Treatment Note    Date:  3/16/2022  Patient Name:  Kenny Rao    :  1975  MRN: [id number]  Physician: Jacque Galarza MD                       Medical Diagnosis: S39.012D (ICD-10-CM) - Strain of muscle, fascia and tendon of lower back, subsequent encounter, S16. 1XXD (ICD-10-CM) - Strain of muscle, fascia and tendon at neck level, subsequent encounter,S50.311D (ICD-10-CM) - Abrasion of right elbow, subsequent encounter, S63.602D (ICD-10-CM) - Unspecified sprain of left thumb, subsequent encounter,S30. 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 States she was able to do laundry at home this week. States her sacral pain is much better - thumb is still very sore. Continues with difficulty gripping/opening jars. Patient is hoping Dr will keep her off a little longer and possible order more therapy.  No longer taking naproxen- now taking motrin 600 mg with relief noted    Pain  Pain 1:  [x] Yes   [] No               Location: Sacral region  Pain Rating: (0-10 scale)  3/10 without pain medication     Symptoms:  [x] Improving   [] Worsening                 [] Same  Descriptors: Constant, sharp and aching   Aggravating factors: sitting, standing and walking   Relieving factors: rest and repositioning, ice/heat  Sleep: Disturbed   Other:      Pain 2:  [x] Yes   [] No               Location: Left thenar eminence/palmar aspect of thumb  Pain Rating: (0-10 scale) 4/10 constant    Symptoms:  [x] Improving   [] Worsening                 [] Same  Descriptors: achy- shooting pain up to elbow intermittently  Aggravating factors: movement especially with gripping/holding objects  Relieving factors: rest, ice/heat, brace  Sleep: No issue     Comments: reviewed emphasis on core stability, mobility, pain relief and postural awareness. [address] [facility] Exercise Log  Protocol Fibromyalgia    Date of Eval: 03/02/2022                               Primary PT: Wicho Cuadra PT, DPT   Diagnosis: S39.012D (ICD-10-CM) - Strain of muscle, fascia and tendon of lower back, subsequent encounter, S16. 1XXD (ICD-10-CM) - Strain of muscle, fascia and tendon at neck level, subsequent encounter,S50.311D (ICD-10-CM) - Abrasion of right elbow, subsequent encounter, S63.602D (ICD-10-CM) - Unspecified sprain of left thumb, subsequent encounter,S30. 0XXD (ICD-10-CM) - Contusion of lower back and pelvis, subsequent encounter   Things to Focus On (goals): Pain control, motion   Surgical Precautions:  Medical Precautions: Prolonged emergence from general anesthesia; Pneumonia; Abdominal bloating; Uterine fibroid; Vagina bleeding; Fibromyalgia;  History of chickenpox  [x] C-9 dates- PRESUMPTIVE AUTH 3X/WK FOR 8 VISITS  [] Occ Med   [] Medicare     Name: Odetta Gosselin  Date 3/10/22 3/11/22 3/14/22 3/16/22    Visit # 5/10 6/10 7/10 8/10    Walk F/L/R 2 Laps 2 Laps 2 Laps 2 Laps    Marching 2 Laps 10x 12x 2 Laps    LE Exercise        Squats 10x5\" 15x5\" 12x5\" 12x5\"    Step-Ups F/L    Low 5x +Lat   Heel-toe raises 10x 10x 12x 12x    SLR F/L/R 10x 10x 12x 12x    Lunges        Knee flex/ext 10x 10x 12x 12x            UE exercises        Horiz abd/add 10x 10x 12x 12x    Alt Flex/Ext 10x 10x 12x 12x    Abd/add 10x 10x 12x 12x    PNF        Bicep Curls 10x 10x 12x 12x    IR/ER 10x 10x 12x 12x    Wall Push-Ups                Kickboard Ex. Small Small Small Small    Iso Abd. 10x5\" 10x5\" 10x5\" 10x5\"    Push-pull Add   10x    Paddling                Ball Shapes   3 shapes Small Ball   5x each cw/ccw Small ball   5x each cw/ccw Small ball  12x ea dir  cw/ccw NT            UE Stretches        Corner stretch        Post. Capsule stretch                LE Stretches        Hamstring 2x20\"  1st step  2x20\"  L 1st step 2x20\" (L) 1st Step    Achilles        Quad                        Deep H2O 1 Noodle 1 Noodle 1 Noodle 1 Noodle    Cycling 1'  1' 2'    Jacks 30\"       Cross-country        Hang 3' 5' 5' 3'            Cool Down 2 Lap Breast stroke 2 Laps Breast stroke 2 Laps Breast stroke 2 Laps Breast Stroke    Pain Rating 5 8 4.5 3-4        Specific Instructions for next treatment:  Resume ball shapes and add lateral step ups    Assessment: [x] Progressing toward goals. Improved tolerance noted this date with squats and hamstring stretch with R LE- less pain complaints. Added forward step ups with minimal reps due to pressure at tail bone. Also able to add kickboard push pull without issue. Patient notes most discomfort with ball shapes under water- aggravates pelvis/tail bone more than thumb. Increased time with deep water cycling without issue. Patient still requires extra time to complete program due to guarding/fear of increasing pain but demonstrates improved mobility/tolerance to aquatic therapy    [] No change. [] Other:     Patient would continue to benefit from skilled physical therapy services that includes aquatic therapy for improved motion and pain control. Goals  LTG: (to be met in 10 treatments)  1. No complaints of pain will be reported within the (L) thumb or sacral region at rest/ADLs. 2. Modified Oswestry Low back Pain Disability Index improved by 10% (MDC)  3. Quick DASH improved by 10 points.    3. Pt will demonstrate independence with her completed home exercise program at discharge. Patient goals: To feel better    Pt. Education:  [] Yes  [x] No  [] Reviewed Prior HEP/Ed  Method of Education: [] Verbal  [] Demo  [] Written  Comprehension of Education: Postural awareness/body mechanics  [] Verbalizes understanding. [] Demonstrates understanding. [] Needs review. [] Demonstrates/verbalizes HEP/Ed previously given. Plan: [x] Continue per plan of care. 2 visits remain on current AUTH; If further PT ordered- consider land/aqua combo to progress core stability/strength toward work simulated activity for a safe return to work   [] Other:        Treatment Charges: Mins Units Time In/Time Out   []  Modalities      []  Ther Exercise      []  Manual Therapy      []  Ther Activities      [x]  Aquatics 48 3 1259PM-147 PM   []  Neuromuscular      [] Vasocompression      [] Gait Training      [] Dry needling        [] 1 or 2 muscles        [] 3 or more muscles      []  Other: Re-Evaluation      Total Treatment time 48 3        3 minute deep water hang un-billable.     Time In: 1259 PM         Time Out: 152 PM    Electronically signed by:  Shanell Shah PTA

## 2022-03-18 ENCOUNTER — HOSPITAL ENCOUNTER (OUTPATIENT)
Dept: PHYSICAL THERAPY | Age: 47
Setting detail: THERAPIES SERIES
Discharge: HOME | End: 2022-03-18
Payer: COMMERCIAL

## 2022-03-18 PROCEDURE — 97113 AQUATIC THERAPY/EXERCISES: CPT

## 2022-03-18 NOTE — FLOWSHEET NOTE
[address] [facility]   [address] [facility] [address]   Phone: [phone]   Fax: [phone]    Physical Therapy Daily Treatment Note    Date:  3/18/2022  Patient Name:  Niecy Reyna    :  1975  MRN: [id number]  Physician: Yogesh Moore MD                       Medical Diagnosis: S39.012D (ICD-10-CM) - Strain of muscle, fascia and tendon of lower back, subsequent encounter, S16. 1XXD (ICD-10-CM) - Strain of muscle, fascia and tendon at neck level, subsequent encounter,S50.311D (ICD-10-CM) - Abrasion of right elbow, subsequent encounter, S63.602D (ICD-10-CM) - Unspecified sprain of left thumb, subsequent encounter,S30. 0XXD (ICD-10-CM) - Contusion of lower back and pelvis, subsequent encounter   Clinical Diagnosis: Sacral pain with mobility deficits and (L) thumb pain  Onset date: 02/10/2022                  Next 's appt.: 2022  PT Visit Information  PT Insurance Information: Worker's Comp C-9 extension 3/11- 3x week for 2 more weeks  Total # of Visits Approved: 16  Total # of Visits to Date: 9  No Show: 0  Canceled Appointment: 0    Subjective:   Pt reports some increased pain this morning due to doing extra around the house while feeling good. States she organized pots and pans while sitting on her kitchen floor. Also gave her dog a bath. States feels like left SI joint getting \"caught\" when walking and performing exercises.     Pain  Pain 1:  [x] Yes   [] No               Location: Sacral region  Pain Rating: (0-10 scale)  5/10 without pain medication     Symptoms:  [x] Improving   [] Worsening                 [] Same  Descriptors: Constant, sharp and aching   Aggravating factors: sitting, standing and walking   Relieving factors: rest and repositioning, ice/heat  Sleep: Disturbed   Other:      Pain 2:  [x] Yes   [] No               Location: Left thenar eminence/palmar aspect of thumb  Pain Rating: (0-10 scale) 4/10 constant    Symptoms:  [x] Improving   [] Worsening                 [] Same  Descriptors: achy- shooting pain up to elbow intermittently  Aggravating factors: movement especially with gripping/holding objects  Relieving factors: rest, ice/heat, brace  Sleep: No issue     Comments: reviewed emphasis on core stability, mobility, pain relief and postural awareness. [address] [facility] Exercise Log  Protocol Fibromyalgia    Date of Eval: 03/02/2022                               Primary PT: Ann-Marie Lemos PT, DPT   Diagnosis: S39.012D (ICD-10-CM) - Strain of muscle, fascia and tendon of lower back, subsequent encounter, S16. 1XXD (ICD-10-CM) - Strain of muscle, fascia and tendon at neck level, subsequent encounter,S50.311D (ICD-10-CM) - Abrasion of right elbow, subsequent encounter, S63.602D (ICD-10-CM) - Unspecified sprain of left thumb, subsequent encounter,S30. 0XXD (ICD-10-CM) - Contusion of lower back and pelvis, subsequent encounter   Things to Focus On (goals): Pain control, motion   Surgical Precautions:  Medical Precautions: Prolonged emergence from general anesthesia; Pneumonia; Abdominal bloating; Uterine fibroid; Vagina bleeding; Fibromyalgia; History of chickenpox  [x] C-9 dates- PRESUMPTIVE AUTH 3X/WK FOR 10 VISITS. C-9 ext. 3/11/22-4/1/22 3x week for 2 weeks  [] Occ Med   [] Medicare     Name: Tre Seay  Date 3/14/22 3/16/22 3/18/22    Visit # 7/10 8/10 9/10    Walk F/L/R 2 Laps 2 Laps 2 Laps    Marching 12x 2 Laps 2 Laps    LE Exercise       Squats 12x5\" 12x5\" 12x5\"    Step-Ups F/L  Low 5x Low 5x +Lat    Heel-toe raises 12x 12x 12x    SLR F/L/R 12x 12x 12x    Lunges       Knee flex/ext 12x 12x 12x           UE exercises       Horiz abd/add 12x 12x 12x    Alt Flex/Ext 12x 12x 12x    Abd/add 12x 12x 12x    PNF       Bicep Curls 12x 12x 12x    IR/ER 12x 12x 12x    Wall Push-Ups              Kickboard Ex.  Small Small Small    Iso Abd. 10x5\" 10x5\" 10x5\"    Push-pull  10x 10x    Paddling              Ball Shapes   3 shapes Small ball  12x ea dir  cw/ccw NT Sm ball   12x ea dir  cw/ccw           UE Stretches       Corner stretch       Post. Capsule stretch              LE Stretches       Hamstring 2x20\"  L 1st step 2x20\" (L) 1st Step 2x20\" (L) 1st step    Achilles       Quad                     Deep H2O 1 Noodle 1 Noodle 1 Noodle    Cycling 1' 2' 2'    Jacks   1'    Cross-country       Hang 5' 3'            Cool Down 2 Laps Breast stroke 2 Laps Breast Stroke 2 Laps Breast stroke    Pain Rating 4.5 3-4 5        Specific Instructions for next treatment:  Increase reps and add deep water cross country next visit. Assessment: [x] Progressing toward goals. Good tolerance to all exercises but notes \"catching\" in left SI joint with LLE movements- usually with returning to neutral position. Denies increased pain but states she feel pressure and \"catching. \"  Added deep water shivani for core and hip stability and resumed UE ball shapes. Denies any pain and did not have time to perform deep water hang today due to needing to leave for another appointment. [] No change. [] Other:     Patient would continue to benefit from skilled physical therapy services that includes aquatic therapy for improved motion and pain control. Goals  LTG: (to be met in 10 treatments)  1. No complaints of pain will be reported within the (L) thumb or sacral region at rest/ADLs. 2. Modified Oswestry Low back Pain Disability Index improved by 10% (MDC)  3. Quick DASH improved by 10 points. 3. Pt will demonstrate independence with her completed home exercise program at discharge. Patient goals: To feel better    Pt. Education:  [] Yes  [x] No  [] Reviewed Prior HEP/Ed  Method of Education: [] Verbal  [] Demo  [] Written  Comprehension of Education: Postural awareness/body mechanics  [] Verbalizes understanding. [] Demonstrates understanding. [] Needs review. [] Demonstrates/verbalizes HEP/Ed previously given.      Plan: [x] Continue per plan of care. 2 visits remain on current AUTH;  If further PT ordered- consider land/aqua combo to progress core stability/strength toward work simulated activity for a safe return to work   [] Other:        Treatment Charges: Mins Units Time In/Time Out   []  Modalities      []  Ther Exercise      []  Manual Therapy      []  Ther Activities      [x]  Aquatics 44 3 [id number]   []  Neuromuscular      [] Vasocompression      [] Gait Training      [] Dry needling        [] 1 or 2 muscles        [] 3 or more muscles      []  Other: Re-Evaluation      Total Treatment time 44 3 0988-6037       Time In: 1050         Time Out: 4484    Electronically signed by:  Wilian Paige PTA

## 2022-03-22 ENCOUNTER — HOSPITAL ENCOUNTER (OUTPATIENT)
Dept: PHYSICAL THERAPY | Age: 47
Setting detail: THERAPIES SERIES
Discharge: HOME | End: 2022-03-22
Payer: COMMERCIAL

## 2022-03-22 PROCEDURE — 97113 AQUATIC THERAPY/EXERCISES: CPT

## 2022-03-22 NOTE — FLOWSHEET NOTE
[facility]   [address] [place] [address] [address]   Phone: [phone]   Fax: [phone]    Physical Therapy Daily Treatment Note    Date:  3/22/2022  Patient Name:  Em Bryant    :  1975  MRN: [id number]  Physician: Zaira Kline MD                       Medical Diagnosis: S39.012D (ICD-10-CM) - Strain of muscle, fascia and tendon of lower back, subsequent encounter, S16. 1XXD (ICD-10-CM) - Strain of muscle, fascia and tendon at neck level, subsequent encounter,S50.311D (ICD-10-CM) - Abrasion of right elbow, subsequent encounter, S63.602D (ICD-10-CM) - Unspecified sprain of left thumb, subsequent encounter,S30. 0XXD (ICD-10-CM) - Contusion of lower back and pelvis, subsequent encounter   Clinical Diagnosis: Sacral pain with mobility deficits and (L) thumb pain  Onset date: 02/10/2022                  Next 's appt.: 2022  PT Visit Information  PT Insurance Information: Worker's Comp C-9 extension 3/11- 3x week for 2 more weeks  Total # of Visits Approved: 16  Total # of Visits to Date: 10  No Show: 0  Canceled Appointment: 0    Subjective:   Pt reports increased discomfort and irritation after re-eval with primary PT. Notes left SI/lumbar area very irritated. Notes getting an MRI on left thumb/hand due to thumb pain.     Pain  Pain 1:  [x] Yes   [] No               Location: Sacral region  Pain Rating: (0-10 scale)  7/10 without pain medication     Symptoms:  [x] Improving   [] Worsening                 [] Same  Descriptors: Constant, sharp and aching   Aggravating factors: sitting, standing and walking   Relieving factors: rest and repositioning, ice/heat  Sleep: Disturbed   Other:      Pain 2:  [x] Yes   [] No               Location: Left thenar eminence/palmar aspect of thumb  Pain Rating: (0-10 scale) 5/10 constant    Symptoms:  [x] Improving   [] Worsening                 [] Same  Descriptors: achy- shooting pain up to elbow intermittently  Aggravating factors: movement especially with gripping/holding objects  Relieving factors: rest, ice/heat, brace  Sleep: No issue     Comments: reviewed emphasis on core stability, mobility, pain relief and postural awareness. [address] [facility] Exercise Log  Protocol Fibromyalgia    Date of Eval: 03/02/2022                               Primary PT: Jacky Yeboah PT, DPT   Diagnosis: S39.012D (ICD-10-CM) - Strain of muscle, fascia and tendon of lower back, subsequent encounter, S16. 1XXD (ICD-10-CM) - Strain of muscle, fascia and tendon at neck level, subsequent encounter,S50.311D (ICD-10-CM) - Abrasion of right elbow, subsequent encounter, S63.602D (ICD-10-CM) - Unspecified sprain of left thumb, subsequent encounter,S30. 0XXD (ICD-10-CM) - Contusion of lower back and pelvis, subsequent encounter   Things to Focus On (goals): Pain control, motion   Surgical Precautions:  Medical Precautions: Prolonged emergence from general anesthesia; Pneumonia; Abdominal bloating; Uterine fibroid; Vagina bleeding; Fibromyalgia; History of chickenpox  [x] C-9 dates- PRESUMPTIVE AUTH 3X/WK FOR 10 VISITS. C-9 ext. 3/11/22-4/1/22 3x week for 2 weeks  [] Occ Med   [] Medicare     Name: Sara Mckeon  Date 3/14/22 3/16/22 3/18/22 3/22/22   Visit # 7/10 8/10 9/10 10/16   Walk F/L/R 2 Laps 2 Laps 2 Laps 2 Laps   Marching 12x 2 Laps 2 Laps 2 Laps   LE Exercise       Squats 12x5\" 12x5\" 12x5\" 12x5\"   Step-Ups F/L  Low 5x Low 5x +Lat Low 5x R only   Heel-toe raises 12x 12x 12x 12x   SLR F/L/R 12x 12x 12x 12x   Lunges       Knee flex/ext 12x 12x 12x 12x          UE exercises       Horiz abd/add 12x 12x 12x 12x   Alt Flex/Ext 12x 12x 12x 12x   Abd/add 12x 12x 12x 12x   PNF       Bicep Curls 12x 12x 12x 12x   IR/ER 12x 12x 12x 12x   Wall Push-Ups              Kickboard Ex.  Small Small Small Small   Iso Abd. 10x5\" 10x5\" 10x5\" 10x5\"   Push-pull  10x 10x 10x   Paddling              Ball Shapes   3 shapes Small ball  12x ea dir  cw/ccw NT Sm ball   12x ea dir  cw/ccw NT          UE Stretches       Corner stretch       Post. Capsule stretch              LE Stretches       Hamstring 2x20\"  L 1st step 2x20\" (L) 1st Step 2x20\" (L) 1st step 2x20\" (L) 1st step   Achilles       Quad                     Deep H2O 1 Noodle 1 Noodle 1 Noodle 1 Noodle   Cycling 1' 2' 2'    Jacks   1'    Cross-country       Hang 5' 3'  3'          Cool Down 2 Laps Breast stroke 2 Laps Breast Stroke 2 Laps Breast stroke 1 Laps   Pain Rating 4.5 3-4 5 7       Specific Instructions for next treatment:  Increase reps and add deep water cross country next visit. Assessment: [] Progressing toward goals. [] No change. [x] Other:  Very limited with aquatic therapy due to high pain after re-eval today. Limited ROM with most exercises and unable to perform step ups F/L and deep water exercises due to high pain. Patient would continue to benefit from skilled physical therapy services that includes aquatic therapy for improved motion and pain control. Goals  LTG: (to be met in 10 treatments)  1. No complaints of pain will be reported within the (L) thumb or sacral region at rest/ADLs. 2. Modified Oswestry Low back Pain Disability Index improved by 10% (MDC)  3. Quick DASH improved by 10 points. 3. Pt will demonstrate independence with her completed home exercise program at discharge. Patient goals: To feel better    Pt. Education:  [] Yes  [x] No  [] Reviewed Prior HEP/Ed  Method of Education: [] Verbal  [] Demo  [] Written  Comprehension of Education: Postural awareness/body mechanics  [] Verbalizes understanding. [] Demonstrates understanding. [] Needs review. [] Demonstrates/verbalizes HEP/Ed previously given. Plan: [x] Continue per plan of care/transition to a land-based exercise program within the next several sessions to further address her physical impairments and activity limitations.     [] Other:        Physical therapy treatment completed today in part by physical therapist assistant (PTA). Treatment Charges: Mins Units Time In/Time Out   []  Modalities      []  Ther Exercise      []  Manual Therapy      []  Ther Activities      [x]  Aquatics 30 2 4563-1129   []  Neuromuscular      [] Vasocompression      [] Gait Training      [] Dry needling        [] 1 or 2 muscles        [] 3 or more muscles      [x]  Other: Re-Evaluation 30 0 9980-3110   Total Treatment time 60 2 [id number]   Treatment times reflect total treatment time combined by both PT and PTA for today's service.      Time In: 1030       Time Out: 8282    Electronically signed by:  Ann-Marie Hough PTA

## 2022-03-22 NOTE — PROGRESS NOTES
[address] [facility] Outpatient Physical Therapy  [address] [place] [address]. [address]         Phone: [phone]       Fax: [phone]    Physical Therapy Progress Note Update     Date:  3/22/2022  Patient: Leigh Ann Elias  : 1975  MRN: [id number]  Physician: Thomas Norris MD    Medical Diagnosis: S39.012D (ICD-10-CM) - Strain of muscle, fascia and tendon of lower back, subsequent encounter, S16. 1XXD (ICD-10-CM) - Strain of muscle, fascia and tendon at neck level, subsequent encounter,S50.311D (ICD-10-CM) - Abrasion of right elbow, subsequent encounter, S63.602D (ICD-10-CM) - Unspecified sprain of left thumb, subsequent encounter,S30. 0XXD (ICD-10-CM) - Contusion of lower back and pelvis, subsequent encounter   Clinical Diagnosis: Sacral pain with mobility deficits/(L) thumb pain     Onset date: 02/10/2022    Next 's appt.: 2022  PT Visit Information  PT Insurance Information: Worker's Comp  Total # of Visits Approved: 16  Total # of Visits to Date: 10  No Show: 0  Canceled Appointment: 0     Subjective  Patient stated that she is still seeing [facility] weekly since her last re-assessment. She stated that following her last office visit her physical therapy has been extended for an additional 2 weeks. MRI has been ordered for her (L) wrist/thumb and her time off work has been extended until 2022. She felt her symptoms have remained unchanged within the sacral region. However, she felt she was moving better. She is able to stand/walk and sit equally on her buttocks for longer periods of time. She can now drive her car and go to the grocery store with her spouse. Overall, she felt her functional level has greatly improved. Comment:  She stated that she had some neck and (R) elbow pain following the incident. However, she stated that those two areas have return to normal and did not feel physical therapy was needed for them.       Pain  Pain:  [x] Yes   [] No      Location: the entire sacral bone  Pain Rating: (0-10 scale)  5/10 without pain medication   Worst: 5/10 without pain medication   Best: 5/10 without pain medication   Symptoms:  [] Improving [] Worsening       [x] Same  Descriptors: Constant, sharp and aching   Aggravating factors: sitting, standing and walking   Relieving factors: rest and repositioning   Sleep: Disturbed   Other:     Pain  Pain:  [x] Yes   [] No      Location: proximal region of the (L) thumb to the joint line of the wrist   Pain Rating: (0-10 scale) 5/10 without pain medication   Worst: 7/10    Best: 5/10   Symptoms:  [] Improving [] Worsening       [x] Same  Descriptors: Constant, achy   Aggravating factors: ADLs involving the (L) wrist/hand   Relieving factors: rest, repositioning   Sleep: Disturbed   Other: No /unable to hold object while performing ADLs. Able to move the thumb now in all planes.  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)  Restrictions - no lifting greater than 5-10 lbs, bending, no prolonged periods of standing, walking, sitting, no pushing/pulling     ADL/IADL Previous level of function Current level of function Who currently assists the patient with task/Comments   Bathing  [x] Independent  [] Assist [x] Independent  [] Assist    Dress/grooming [x] Independent  [] Assist [x] Independent  [] Assist    Transfer/mobility [x] Independent  [] Assist [x] Independent  [] Assist    Feeding [x] Independent  [] Assist [x] Independent  [] Assist    Toileting [x] Independent  [] Assist [x] Independent  [] Assist    Driving [x] Independent  [] Assist [x] Independent  [] Assist    Housekeeping [x] Independent  [] Assist [] Independent  [x] Assist    Grocery shop/meal prep [x] Independent  [] Assist [] Independent  [x] Assist      Gait Prior level of function Current level of function    [x] Independent  [] Assist [x] Independent  [] Assist   Device: [x] Independent [x] Independent    [] Straight Cane [] Quad cane [] Straight Cane [] Quad cane    [] Standard walker [] Rolling walker   [] 4 wheeled walker [] Standard walker [] Rolling walker   [] 4 wheeled walker    [] Wheelchair [] Wheelchair     Objective  Observation/Palpation   Normal Deficit Comments   Observation [x] [] Equal sitting on the buttocks   Equal weight bearing on both LEs    Posture  [] []    Palpation [] [x] Tenderness to the touch (L) SI joint   Tenderness to the touch felt at the base of the (L) thumb/wrist joint on the palm side    Edema [] []    Scar [] []    Other [] []      Range of Motion  Spine - Range of Motion  Cervical  WNL in all planes   Thoracic  WNL in all planes   Lumbar  Flexion - WNL with pain @ end range at the (L) SI region and aberrant motion upon returning, Extension - WNL with pain @ end range, (R) side bend - WNL, (L) side bend - WNL     Right Lower Extremity - Active ROM  Hip flexion  WNL   Hip extension  WNL   Hip abduction  WNL   Hip adduction  WNL   Hip ER  WNL   Hip IR  WNL   Knee flexion  WNL   Knee extension  WNL   WNL   Dorsiflexion  WNL   Plantarflexion  WNL     Left Lower Extremity - Active ROM  Hip flexion  WNL   Hip extension  WNL   Hip abduction  WNL   Hip adduction  WNL   Hip ER  WNL   Hip IR  WNL   Knee flexion  WNL   Knee extension  WNL   Dorsiflexion  WNL   Plantarflexion  WNL     Strength  Spine Normal Deficit Comments   Anterior chain   [] [x] Supine Hook lying Posterior Pelvic Tilt - immediate increase in pain still noted with contraction.      Posterior chain   [] [x] Prone press up with pain @ end range still    Lateral chain   [] [x] (R) hip abduction - 3/5 MMT with pain @ end range  (L)  Hip abduction - 2+/5 MMT with pain @ end range     Right Lower Extremity - Strength  Hip flexion  5/5 MMT   Hip extension  5/5 MMT   Hip abduction  5/5 MMT   Hip adduction  5/5 MMT   Hip ER  5/5 MMT   Hip IR  5/5 MMT   Knee flexion  5/5 MMT   Knee extension  5/5 MMT   Dorsiflexion  5/5 MMT   Plantarflexion  5/5 MMT     Left Lower Extremity - Strength  Hip flexion  5/5 MMT   Hip extension  5/5 MMT   Hip abduction  5/5 MMT   Hip adduction  5/5 MMT   Hip ER  5/5 MMT   Hip IR  5/5 MMT   Knee flexion  5/5 MMT   Knee extension  5/5 MMT   Dorsiflexion  5/5 MMT   Plantarflexion  5/5 MMT     Right Upper Extremity - Active Range of Motion    Shoulder Flexion  WNL    Shoulder Extension  WNL    Shoulder Abduction  WNL    Shoulder ER  WNL    Shoulder IR  WNL    Elbow Flexion  WNL    Elbow Extension  WNL    Wrist Flexion  WNL    Wrist Extension  WNL    Wrist Ulnar Deviation  WNL    Wrist Radial Deviation  WNL      Left  Upper Extremity - Active Range of Motion    Shoulder Flexion   WNL    Shoulder Extension  WNL    Shoulder Abduction  WNL    Shoulder ER  WNL    Shoulder IR  WNL    Elbow Flexion  WNL    Elbow Extension  WNL    Wrist Flexion  WNL   Wrist Extension  WNL   Wrist Ulnar Deviation  WNL   Wrist Radial Deviation  WNL with pain @ end range    Right Upper Extremity - Strength  Shoulder Flexion   5/5 MMT   Shoulder Extension   5/5 MMT   Shoulder Abduction  5/5 MMT   Shoulder ER  5/5 MMT   Shoulder IR  5/5 MMT   Elbow Flexion  5/5 MMT   Elbow Extension  5/5 MMT   Wrist Flexion  5/5 MMT   Wrist Extension  5/5 MMT   Wrist Ulnar Deviation  5/5 MMT   Wrist Radial Deviation  5/5 MMT     Left  Upper Extremity - Strength  Shoulder Flexion   5/5 MMT   Shoulder Extension  5/5 MMT   Shoulder Abduction  5/5 MMT   Shoulder ER  5/5 MMT   Shoulder IR  5/5 MMT   Elbow Flexion  5/5 MMT   Elbow Extension  5/5 MMT   Wrist Flexion  3/5 MMT   Wrist Extension  3/5 MMT   Wrist Ulnar Deviation  3/5 MMT   Wrist Radial Deviation  3/5 MMT     Additional Measures    Normal Deficit Comments   Sensation   [] []    Reflexes   [] []    Gross motor   [] []    Flexibility    [] [x] (B) hamstring tightness was apparent but improved    Special Tests   [] []     strength   [] []    Other   [] []      Assessment  Patient continues to present with moderate irritability within the sacral region and the lyons base of the (L) thumb. Tenderness to the touch was noted throughout both regions. Core weakness was still noted throughout all three chains with pain at end range(s). However, improved trunk motion was noted with aberrant motion still being observed. Functional improvement was shown per the self-reported QuickDASH for the (L) hand/wrist of +2 points. A clinically significant improvement (+10%) was shown for the sacral region per the self-reported Modified Oswestry Low Back Pain Disability Index (76% down to 34%) Patient would still continue to benefit from skilled physical therapy services that includes aquatic therapy and begin to transition to a land-based exercise program to further address her physical impairments and activity limitations still noted. Goals  LTG: (to be met in 10 treatments)  1. No complaints of pain will be reported within the (L) thumb or sacral region at rest/ADLs. (Not Met)  2. Modified Oswestry Low back Pain Disability Index improved by 10% (MDC) (Met)  3. Quick DASH improved by 10 points. (Not Met)  4. Pt will demonstrate independence with her completed home exercise program at discharge. (Not Met)    Patient goals: To feel better    Patient demonstrated improvement from condition with _1_ of_4__ long term goals     Comments/Function:  Able to stand/walk and sit equally on her buttocks for longer periods of time. She can now drive her car and go to the grocery store with her spouse.     PT Education: [] Home Exercise Program  Comprehension [] Good [] Fair [] Poor  [x] Plans/Goals developed/discussed with pt/family [] Other    Recommendations: Continue to progress with the new prescription provided for an additional 2 weeks (3x a week)/transition to a land-based exercise program to further address her physical impairments and activity limitations. [] Patient is Discharged At This Time Unless Further Orders Are Received. New Script Needed To Continue Treatment: [x] No   [] Yes  (visits left on current prescription:       6        ) Please sign orders at the bottom of this page and return by faxing. Thank you. Current Treatment:  [] Therapeutic Exercise    [] Modalities                [] Work Conditioning  [] Therapeutic Activity    [] Ultrasound  [] Electrical Stimulation  [] Gait Training     [] Massage       [] Lumbar/Cervical Traction  [] Neuromuscular Re-education [] Cold/hotpack [] Iontophoresis: 4 mg/mL  [] Instruction in Home Exercise Program                     Dexamethasone Sodium  [] Manual Therapy             Phosphate 40-80 mAmin  [x] Aquatic Therapy                                 [] Vaso Pneumatic compression  [] Other:  More objective information is available upon request.                      Physical Therapy Prescription:      [] Continue current Rx    [] Therapist Discretion    [] Rx as below  Recommended Changes to Treatment:  [] Heat/Cold  [] Stretching  [] Therapeutic Exercise  [] Reconditioning  [] Massage  [] Ultrasound  [] Electrical Stim  [] Iontophoresis: 40mg/ml Dexamethasone Sodium Phosphate 40-80 mAmin  [] Aquatics  [] Vasocompression  [] Other:  Frequency:          X/wk for          wks    Medicare/Regulatory Requirements:  I have reviewed this plan of care and certify a need for medically necessary rehabilitation services.   [] Physician Signature                                                   Date:       Thank you for your referral                 Electronically signed by: Lola Patten PT DPT    [facility] ([facility] @ [facility]  [address]  [facility].  Phone [phone]  Fax [phone]

## 2022-03-24 ENCOUNTER — HOSPITAL ENCOUNTER (OUTPATIENT)
Dept: PHYSICAL THERAPY | Age: 47
Setting detail: THERAPIES SERIES
Discharge: HOME | End: 2022-03-24
Payer: COMMERCIAL

## 2022-03-24 PROCEDURE — 97113 AQUATIC THERAPY/EXERCISES: CPT

## 2022-03-24 NOTE — FLOWSHEET NOTE
[address] [place] [address] Outpatient Physical Therapy   [address] [place] [address] [address]   Phone: [phone]   Fax: [phone]    Physical Therapy Daily Treatment Note    Date:  3/24/2022  Patient Name:  Lacinda Schwab    :  1975  MRN: [id number]  Physician: Monse Roland MD                       Medical Diagnosis: S39.012D (ICD-10-CM) - Strain of muscle, fascia and tendon of lower back, subsequent encounter, S16. 1XXD (ICD-10-CM) - Strain of muscle, fascia and tendon at neck level, subsequent encounter,S50.311D (ICD-10-CM) - Abrasion of right elbow, subsequent encounter, S63.602D (ICD-10-CM) - Unspecified sprain of left thumb, subsequent encounter,S30. 0XXD (ICD-10-CM) - Contusion of lower back and pelvis, subsequent encounter   Clinical Diagnosis: Sacral pain with mobility deficits and (L) thumb pain  Onset date: 02/10/2022                  Next 's appt.: 2022  PT Visit Information  PT Insurance Information: Worker's Comp C-9 extension 3/11- 3x week for 2 more weeks  Total # of Visits Approved: 16  Total # of Visits to Date: 11  No Show: 0  Canceled Appointment: 0    Subjective:   Pt reports decreased pain since last visit. Was able to go home and relax to decrease pain after last visit. States son had her performing stairs at home to get stronger and was sore afterwards.     Pain  Pain 1:  [x] Yes   [] No               Location: Sacral region  Pain Rating: (0-10 scale)  5/10 without pain medication     Symptoms:  [x] Improving   [] Worsening                 [] Same  Descriptors: Constant, sharp and aching   Aggravating factors: sitting, standing and walking   Relieving factors: rest and repositioning, ice/heat  Sleep: Disturbed   Other:      Pain 2:  [x] Yes   [] No               Location: Left thenar eminence/palmar aspect of thumb  Pain Rating: (0-10 scale) 5/10 constant    Symptoms:  [x] Improving   [] Worsening                 [] Same  Descriptors: achy- shooting pain up to elbow intermittently  Aggravating factors: movement especially with gripping/holding objects  Relieving factors: rest, ice/heat, brace  Sleep: No issue     Comments: reviewed emphasis on core stability, mobility, pain relief and postural awareness. [facility] Exercise Log  Protocol Fibromyalgia    Date of Eval: 03/02/2022                               Primary PT: Dennys Cihlders PT, DPT   Diagnosis: S39.012D (ICD-10-CM) - Strain of muscle, fascia and tendon of lower back, subsequent encounter, S16. 1XXD (ICD-10-CM) - Strain of muscle, fascia and tendon at neck level, subsequent encounter,S50.311D (ICD-10-CM) - Abrasion of right elbow, subsequent encounter, S63.602D (ICD-10-CM) - Unspecified sprain of left thumb, subsequent encounter,S30. 0XXD (ICD-10-CM) - Contusion of lower back and pelvis, subsequent encounter   Things to Focus On (goals): Pain control, motion   Surgical Precautions:  Medical Precautions: Prolonged emergence from general anesthesia; Pneumonia; Abdominal bloating; Uterine fibroid; Vagina bleeding; Fibromyalgia; History of chickenpox  [x] C-9 dates- PRESUMPTIVE AUTH 3X/WK FOR 10 VISITS. C-9 ext. 3/11/22-4/1/22 3x week for 2 weeks  [] Occ Med   [] Medicare     Name: Saray Vega  Date 3/16/22 3/18/22 3/22/22 3/24/22   Visit # 8/10 9/10 10/16 11/16   Walk F/L/R 2 Laps 2 Laps 2 Laps 2 Laps   Marching 2 Laps 2 Laps 2 Laps 2 Laps   LE Exercise       Squats 12x5\" 12x5\" 12x5\" 12x5\"   Step-Ups F/L Low 5x Low 5x +Lat Low 5x R only Low 5x R only   Heel-toe raises 12x 12x 12x 12x   SLR F/L/R 12x 12x 12x 12x   Lunges       Knee flex/ext 12x 12x 12x 12x          UE exercises       Horiz abd/add 12x 12x 12x 12x   Alt Flex/Ext 12x 12x 12x 12x   Abd/add 12x 12x 12x 12x   PNF       Bicep Curls 12x 12x 12x 12x   IR/ER 12x 12x 12x 12x   Wall Push-Ups              Kickboard Ex.  Small Small Small Small   Iso Abd. 10x5\" 10x5\" 10x5\" 10x5\"   Push-pull 10x 10x 10x 10x   Bijan Cochran Shapes   3 shapes NT Sm ball   12x ea dir  cw/ccw NT Sm Ball  10x ea dir  cw/ccw          UE Stretches       Corner stretch       Post. Capsule stretch              LE Stretches       Hamstring 2x20\" (L) 1st Step 2x20\" (L) 1st step 2x20\" (L) 1st step 2x20\"   Achilles       Quad                     Deep H2O 1 Noodle 1 Noodle 1 Noodle 1 Noodle   Cycling 2' 2'     Jacks  1'     Cross-country       Hang 3'  3' 3'          Cool Down 2 Laps Breast Stroke 2 Laps Breast stroke 1 Laps 2 Laps Breast 5   Pain Rating 3-4 5 7 5       Specific Instructions for next treatment:  Increase reps and add deep water cross country next visit. Assessment: [x] Progressing toward goals. Able to resume full aquatic therapy program today. Tolerated all exercises well with exception to lateral step ups- only able to complete 6 reps today. Good technique throughout with no UE support to challenge core and hip stability. [] No change. [] Other:      Patient would continue to benefit from skilled physical therapy services that includes aquatic therapy for improved motion and pain control. Goals  LTG: (to be met in 16 treatments)  1. No complaints of pain will be reported within the (L) thumb or sacral region at rest/ADLs. 2. Quick DASH improved by 10 points. 3. Pt will demonstrate independence with her completed home exercise program at discharge. Patient goals: To feel better    Pt. Education:  [] Yes  [x] No  [] Reviewed Prior HEP/Ed  Method of Education: [] Verbal  [] Demo  [] Written  Comprehension of Education: Postural awareness/body mechanics  [] Verbalizes understanding. [] Demonstrates understanding. [] Needs review. [] Demonstrates/verbalizes HEP/Ed previously given. Plan: [x] Continue per plan of care.     [] Other:        Treatment Charges: Mins Units Time In/Time Out   []  Modalities      []  Ther Exercise      []  Manual Therapy      []  Ther Activities      [x]  Aquatics 39 0 1371-7287   [] Neuromuscular      [] Vasocompression      [] Gait Training      [] Dry needling        [] 1 or 2 muscles        [] 3 or more muscles      []  Other: Re-Evaluation      Total Treatment time 39 3 [id number]   5 minute deep water hang un-billable time    Time In: 5849         Time Out: 1005    Electronically signed by:  Noel Sheppard PTA

## 2022-03-25 ENCOUNTER — HOSPITAL ENCOUNTER (OUTPATIENT)
Dept: MRI IMAGING | Facility: CLINIC | Age: 47
Discharge: HOME | End: 2022-03-27
Payer: COMMERCIAL

## 2022-03-25 ENCOUNTER — HOSPITAL ENCOUNTER (OUTPATIENT)
Dept: PHYSICAL THERAPY | Age: 47
Setting detail: THERAPIES SERIES
Discharge: HOME | End: 2022-03-25
Payer: COMMERCIAL

## 2022-03-25 DIAGNOSIS — S30.0XXD CONTUSION OF COCCYX, SUBSEQUENT ENCOUNTER: ICD-10-CM

## 2022-03-25 DIAGNOSIS — S39.012D LUMBAR SPINE STRAIN, SUBSEQUENT ENCOUNTER: ICD-10-CM

## 2022-03-25 DIAGNOSIS — S16.1XXD STRAIN, CERVICAL, SUBSEQUENT ENCOUNTER: ICD-10-CM

## 2022-03-25 DIAGNOSIS — S63.602D SPRAIN OF LEFT THUMB, SUBSEQUENT ENCOUNTER: ICD-10-CM

## 2022-03-25 DIAGNOSIS — S50.311D ABRASION OF RIGHT ELBOW, SUBSEQUENT ENCOUNTER: ICD-10-CM

## 2022-03-25 PROCEDURE — 73218 MRI UPPER EXTREMITY W/O DYE: CPT

## 2022-03-25 PROCEDURE — 97113 AQUATIC THERAPY/EXERCISES: CPT

## 2022-03-25 NOTE — FLOWSHEET NOTE
[address] [place] [address] Outpatient Physical Therapy   [address] [facility] [address]   Phone: [phone]   Fax: [phone]    Physical Therapy Daily Treatment Note    Date:  3/25/2022  Patient Name:  Prince Escobar    :  1975  MRN: [id number]  Physician: Jackie Buenrostro MD                       Medical Diagnosis: S39.012D (ICD-10-CM) - Strain of muscle, fascia and tendon of lower back, subsequent encounter, S16. 1XXD (ICD-10-CM) - Strain of muscle, fascia and tendon at neck level, subsequent encounter,S50.311D (ICD-10-CM) - Abrasion of right elbow, subsequent encounter, S63.602D (ICD-10-CM) - Unspecified sprain of left thumb, subsequent encounter,S30. 0XXD (ICD-10-CM) - Contusion of lower back and pelvis, subsequent encounter   Clinical Diagnosis: Sacral pain with mobility deficits and (L) thumb pain  Onset date: 02/10/2022                  Next 's appt.: 2022  PT Visit Information  PT Insurance Information: Worker's Comp C-9 extension 3/11- 3x week for 2 more weeks  Total # of Visits Approved: 16  Total # of Visits to Date: 12  No Show: 0  Canceled Appointment: 0    Subjective:   Pt reports some increased soreness after yesterday's visit but lumbar back pain decreased since then. States thumb/hand pain has not changed recently and pt has MRI scheduled later today on left hand/thumb. Notes daily activities tend to be getting easier but still limited with squatting, pushing and pulling activities.     Pain  Pain 1:  [x] Yes   [] No               Location: Sacral region  Pain Rating: (0-10 scale)  4/10 without pain medication     Symptoms:  [x] Improving   [] Worsening                 [] Same  Descriptors: Constant, sharp and aching   Aggravating factors: sitting, standing and walking   Relieving factors: rest and repositioning, ice/heat  Sleep: Disturbed   Other:      Pain 2:  [x] Yes   [] No               Location: Left thenar eminence/palmar aspect of thumb  Pain Rating: (0-10 scale) 5/10 constant    Symptoms:  [x] Improving   [] Worsening                 [] Same  Descriptors: achy- shooting pain up to elbow intermittently  Aggravating factors: movement especially with gripping/holding objects  Relieving factors: rest, ice/heat, brace  Sleep: No issue     Comments: reviewed emphasis on core stability, mobility, pain relief and postural awareness. [facility] Exercise Log  Protocol Fibromyalgia    Date of Eval: 03/02/2022                               Primary PT: Nicko Huitron PT, DPT   Diagnosis: S39.012D (ICD-10-CM) - Strain of muscle, fascia and tendon of lower back, subsequent encounter, S16. 1XXD (ICD-10-CM) - Strain of muscle, fascia and tendon at neck level, subsequent encounter,S50.311D (ICD-10-CM) - Abrasion of right elbow, subsequent encounter, S63.602D (ICD-10-CM) - Unspecified sprain of left thumb, subsequent encounter,S30. 0XXD (ICD-10-CM) - Contusion of lower back and pelvis, subsequent encounter   Things to Focus On (goals): Pain control, motion   Surgical Precautions:  Medical Precautions: Prolonged emergence from general anesthesia; Pneumonia; Abdominal bloating; Uterine fibroid; Vagina bleeding; Fibromyalgia; History of chickenpox  [x] C-9 dates- PRESUMPTIVE AUTH 3X/WK FOR 10 VISITS. C-9 ext. 3/11/22-4/1/22 3x week for 2 weeks  [] Occ Med   [] Medicare     Name: Abhi García  Date 3/22/22 3/24/22 3/25/22   Visit # 10/16 11/16 12/16   Walk F/L/R 2 Laps 2 Laps 2 Laps   Marching 2 Laps 2 Laps 2 Laps   LE Exercise      Squats 12x5\" 12x5\" 12x5\"   Step-Ups F/L Low 5x R only Low 5x R only Low 5x R only   Heel-toe raises 12x 12x 12x   SLR F/L/R 12x 12x 12x   Lunges      Knee flex/ext 12x 12x 12x         UE exercises      Horiz abd/add 12x 12x 12x   Alt Flex/Ext 12x 12x 12x   Abd/add 12x 12x 12x   PNF      Bicep Curls 12x 12x 12x   IR/ER 12x 12x 12x   Wall Push-Ups            Kickboard Ex.  Small Small Small   Iso Abd. 10x5\" 10x5\" 10x5\"   Push-pull 10x 10x 10x Paddling            Ball Shapes   3 shapes NT Sm Ball  10x ea dir  cw/ccw Sm Ball  10x ea dir  cw/ccw         UE Stretches      Corner stretch      Post. Capsule stretch            LE Stretches      Hamstring 2x20\" (L) 1st step 2x20\" 2x20\"   Achilles      Quad                  Deep H2O 1 Noodle 1 Noodle 1 Noodle   Cycling   2'   Jacks   1'   Cross-country      Hang 3' 3' 5'         Cool Down 1 Laps 2 Laps Breast stroke 2 Laps Breast stroke   Pain Rating 7 5 4       Specific Instructions for next treatment:   Progress reps and add deep water cross-country next visit. Assessment: [x] Progressing toward goals. Continued with emphasis on core stability and postural awareness. Good tolerance to exercises above. Added cycling and jacks in deep water for hip/core stability and LE strengthening to patient's tolerance. Notes some discomfort with jacks. Educated to decreased height and speed for less resistance and better tolerance of exercise. [] No change. [] Other:      Patient would continue to benefit from skilled physical therapy services that includes aquatic therapy for improved motion and pain control. Goals  LTG: (to be met in 16 treatments)  1. No complaints of pain will be reported within the (L) thumb or sacral region at rest/ADLs. 2. Quick DASH improved by 10 points. 3. Pt will demonstrate independence with her completed home exercise program at discharge. Patient goals: To feel better    Pt. Education:  [] Yes  [x] No  [] Reviewed Prior HEP/Ed  Method of Education: [] Verbal  [] Demo  [] Written  Comprehension of Education: Postural awareness/body mechanics  [] Verbalizes understanding. [] Demonstrates understanding. [] Needs review. [] Demonstrates/verbalizes HEP/Ed previously given. Plan: [x] Continue per plan of care.     [] Other:        Treatment Charges: Mins Units Time In/Time Out   []  Modalities      []  Ther Exercise      []  Manual Therapy      []  Ther Activities [x]  Aquatics 38 3 1745-8203   []  Neuromuscular      [] Vasocompression      [] Gait Training      [] Dry needling        [] 1 or 2 muscles        [] 3 or more muscles      []  Other: Re-Evaluation      Total Treatment time 38 3 0920 1003   5 minute deep water hang un-billable time    Time In:  1379        Time Out: 1003    Electronically signed by:  Jarek Mae PTA

## 2022-03-28 ENCOUNTER — HOSPITAL ENCOUNTER (OUTPATIENT)
Dept: PHYSICAL THERAPY | Age: 47
Setting detail: THERAPIES SERIES
Discharge: HOME | End: 2022-03-28
Payer: COMMERCIAL

## 2022-03-28 PROCEDURE — 97110 THERAPEUTIC EXERCISES: CPT

## 2022-03-28 NOTE — PROGRESS NOTES
[facility] Outpatient Physical Therapy   [address]   Phone: [phone]   Fax: [phone]    Physical Therapy Daily Treatment Note    Date:  3/28/2022  Patient: Aakash Valencia  : 1975  MRN: [id number]  Physician: Asim MONTIEL                        Medical Diagnosis: S39.012D (ICD-10-CM) - Strain of muscle, fascia and tendon of lower back, subsequent encounter, S16. 1XXD (ICD-10-CM) - Strain of muscle, fascia and tendon at neck level, subsequent encounter,S50.311D (ICD-10-CM) - Abrasion of right elbow, subsequent encounter, S63.602D (ICD-10-CM) - Unspecified sprain of left thumb, subsequent encounter,S30. 0XXD (ICD-10-CM) - Contusion of lower back and pelvis, subsequent encounter     Clinical Diagnosis: Sacral pain with mobility deficits and (L) thumb pain  Onset date: 02/10/2022   Next 's appt.: 2022  PT Visit Information  PT Insurance Information: Worker's Comp C-9 extension 3/11- 3x week for 2 more weeks  Total # of Visits Approved: 16  Total # of Visits to Date: 13  No Show: 0  Canceled Appointment: 0    Subjective  Patient stated that she continues to feel better and hopes she can go back to work soon. She felt her activity level has steadily improved since her last treatment session and expressed no issues. (L) hand/thumb is feeling better. Pain is no longer constant. She stated that she is able to use it as long as the base of the thumb/wrist joint line is not involved. \"Catching\" sensation is no longer noted with activity/walking as it had in the past. Discomfort still noted with stair climbing. Pain:  [x] Yes   [] No      Location: Sacral region   Pain Rating: 3/10 without pain medication  Worst: 3/10 without pain medication   Best: 3/10 without pain medication   Descriptors: Constant, dull, achy, \"pressure\"   Other:     Pain 2:  [x]? ? Yes   []? ? No               Location: Left thenar eminence/palmar aspect of thumb  Pain Rating: (0-10 scale) 0/10 without pain medication  Worst: 5/10 without pain medication/with palpation   Best: 0/10 without pain medication   Descriptors: sharp    Objective  Modalities:   Precautions:   Exercises:  Exercise Reps/ Time Weight/ Level Comments   Standing Trunk (L) Lateral Flexion  10 reps      Standing Trunk Flexion  10 reps      Standing Trunk Extension  10 reps        Passive stretching  Supine SKTC 30 sec hold x 3 reps with each leg  Supine DKTC 30 sec hold x 3 reps with each leg  Supine (L) IR stretch 30 sec hold x 3 reps   Supine (L) piriformis stretch 30 sec hold x 3 reps    Pt. Education:  [] Yes  [] No  [] Reviewed Prior HEP/Ed  Method of Education: [] Verbal  [] Demo  [] Written  HEP:   Comprehension of Education:  [] Verbalizes understanding. [] Demonstrates understanding. [] Needs review. [] Demonstrates/verbalizes HEP/Ed previously given. Assessment  Initiated a therapeutic exercise program to include passive stretching along with active trunk motion. Piriformis tightness was apparent. Active trunk motions were WNL with pain noted at end range extension within the sacral region. (L) hip compression at 90 increased pain within sacral region. (L) anterior pelvic compression was positive. Relief was noted with (L) side lying pelvic distraction. (L) hand/thumb motion WNL in all planes but  to the touch base of the base/wrist joint line. Goals  LTG: (to be met in 16 treatments)  1. No complaints of pain will be reported within the (L) thumb or sacral region at rest/ADLs.   2. Quick DASH improved by 10 points.   3. Pt will demonstrate independence with her completed home exercise program at discharge.      Patient goals: To feel better     Plan: [x] Continue per plan of care.    [] Other:      Treatment Charges: Mins Units Time In/Out   [] Evaluation       []  Low       []  Moderate       []  High      []  Modalities        [x]  Ther Exercise 45 6 [id number]   []  Neuromuscular Re-ed      []  Gait Training      []  Manual Therapy      []  Ther Activities      []  Aquatics      []  Vasocompression      []  Cervical Traction      []  Other      Total Treatment time 45 min 3         Time In: 9071           Time Out: 1000    Electronically signed by:  CARLOS FriedT

## 2022-03-29 ENCOUNTER — HOSPITAL ENCOUNTER (OUTPATIENT)
Dept: PHYSICAL THERAPY | Age: 47
Setting detail: THERAPIES SERIES
Discharge: HOME | End: 2022-03-29
Payer: COMMERCIAL

## 2022-03-29 PROCEDURE — 97110 THERAPEUTIC EXERCISES: CPT

## 2022-03-29 NOTE — PROGRESS NOTES
[facility] Outpatient Physical Therapy   [address]   Phone: [phone]   Fax: [phone]    Physical Therapy Daily Treatment Note    Date:  3/29/2022  Patient: Nciolas Neal  : 1975  MRN: [id number]  Physician: Kan CALDERON                        Medical Diagnosis: S39.012D (ICD-10-CM) - Strain of muscle, fascia and tendon of lower back, subsequent encounter, S16. 1XXD (ICD-10-CM) - Strain of muscle, fascia and tendon at neck level, subsequent encounter,S50.311D (ICD-10-CM) - Abrasion of right elbow, subsequent encounter, S63.602D (ICD-10-CM) - Unspecified sprain of left thumb, subsequent encounter,S30. 0XXD (ICD-10-CM) - Contusion of lower back and pelvis, subsequent encounter     Clinical Diagnosis: Sacral pain with mobility deficits and (L) thumb pain  Onset date: 02/10/2022   Next 's appt.: 2022  PT Visit Information  PT Insurance Information: Worker's Comp C-9 extension 3/11- 3x week for 2 more weeks  Total # of Visits Approved: 16  Total # of Visits to Date: 14  No Show: 0  Canceled Appointment: 0    Subjective  Patient stated that her symptoms are relatively unchanged from her previous treatment session. She stated that she felt some soreness last night following yesterday's treatment session but resolved with pain medication. In addition, pt stated that the images of the (L) thumb were inconclusive and she is going to have a new set of images taken in a different position. Pain:  [x] Yes   [] No      Location: Sacral region   Pain Rating: 3/10 with pain medication  Worst: 3/10 with pain medication   Best: 3/10 with  pain medication   Descriptors: Constant, dull, achy, \"pressure\"   Other:     Pain 2:  [x]? ? Yes   []? ? No               Location: Left thenar eminence/palmar aspect of thumb  Pain Rating: (0-10 scale) 0/10 with pain medication  Worst: 5/10 with pain medication/with palpation   Best: 0/10 with pain medication   Descriptors: sharp    Objective  Modalities:   Precautions:   Exercises:  Exercise Reps/ Time Weight/ Level Comments   Standing Trunk (L) Lateral Flexion  10 reps      Standing Trunk Flexion  10 reps      Standing Trunk Extension  10 reps        Passive stretching  Supine SKTC 30 sec hold x 3 reps with each leg  Supine DKTC 30 sec hold x 3 reps with each leg  Supine (L) IR stretch 30 sec hold x 3 reps   Supine (L) piriformis stretch 30 sec hold x 3 reps    Pt. Education:  [] Yes  [] No  [] Reviewed Prior HEP/Ed  Method of Education: [] Verbal  [] Demo  [] Written  HEP:   Comprehension of Education:  [] Verbalizes understanding. [] Demonstrates understanding. [] Needs review. [] Demonstrates/verbalizes HEP/Ed previously given. Assessment  Continue with exercises. Relatively unchanged from her previous treatment session. Goals  LTG: (to be met in 16 treatments)  1. No complaints of pain will be reported within the (L) thumb or sacral region at rest/ADLs.   2. Quick DASH improved by 10 points.   3. Pt will demonstrate independence with her completed home exercise program at discharge.      Patient goals: To feel better     Plan: [x] Continue per plan of care.    [] Other:      Treatment Charges: Mins Units Time In/Out   [] Evaluation       []  Low       []  Moderate       []  High      []  Modalities        [x]  Ther Exercise 45 3 [id number]   []  Neuromuscular Re-ed      []  Gait Training      []  Manual Therapy      []  Ther Activities      []  Aquatics      []  Vasocompression      []  Cervical Traction      []  Other      Total Treatment time 45 min 3       Time In: 1000          Time Out: 1338    Electronically signed by:  Faby Nielsen PT DPT

## 2022-03-31 ENCOUNTER — APPOINTMENT (OUTPATIENT)
Dept: PHYSICAL THERAPY | Age: 47
End: 2022-03-31
Payer: COMMERCIAL

## 2022-04-01 ENCOUNTER — HOSPITAL ENCOUNTER (OUTPATIENT)
Dept: PHYSICAL THERAPY | Age: 47
Setting detail: THERAPIES SERIES
Discharge: HOME | End: 2022-04-01
Payer: COMMERCIAL

## 2022-04-01 PROCEDURE — 97110 THERAPEUTIC EXERCISES: CPT

## 2022-04-01 NOTE — PROGRESS NOTES
[facility] Outpatient Physical Therapy   [address]   Phone: [phone]   Fax: [phone]    Physical Therapy Daily Treatment Note    Date:  2022  Patient: Netta Cam  : 1975  MRN: [id number]  Physician: Varun ANTONIO                        Medical Diagnosis: S39.012D (ICD-10-CM) - Strain of muscle, fascia and tendon of lower back, subsequent encounter, S16. 1XXD (ICD-10-CM) - Strain of muscle, fascia and tendon at neck level, subsequent encounter,S50.311D (ICD-10-CM) - Abrasion of right elbow, subsequent encounter, S63.602D (ICD-10-CM) - Unspecified sprain of left thumb, subsequent encounter,S30. 0XXD (ICD-10-CM) - Contusion of lower back and pelvis, subsequent encounter     Clinical Diagnosis: Sacral pain with mobility deficits and (L) thumb pain  Onset date: 02/10/2022   Next 's appt.: 2022  PT Visit Information  PT Insurance Information: Worker's Comp C-9 extension 3/11- 3x week for 2 more weeks  Total # of Visits Approved: 16  Total # of Visits to Date: 15  No Show: 0  Canceled Appointment: 0    Subjective  Symptoms relatively unchanged from her previous treatment session. She has been doing some lifting and carrying without difficulty. Pain:  [x] Yes   [] No      Location: Sacral region   Pain Rating: 3/10 with pain medication  Worst: 3/10 with pain medication   Best: 3/10 with  pain medication   Descriptors: Constant, dull, achy, \"pressure\"   Other:     Pain 2:  [x]? ? Yes   []? ? No               Location: Left thenar eminence/palmar aspect of thumb  Pain Rating: (0-10 scale) 0/10 with pain medication  Worst: 5/10 with pain medication/with palpation   Best: 0/10 with pain medication   Descriptors: sharp    Objective  Modalities:   Precautions:   Exercises:  Exercise Reps/ Time Weight/ Level Comments   Supine SLR  10 reps   With each leg    Side lying Hip Abduction  10 reps   With each leg    Side lying Clamshells  10 reps  With each leg   Side lying reverse clamshells 10 reps   With each leg    Prone Hip Extension  10 reps   With each leg    Total Gym  20 reps  L 10     Hamstring Curl  20 reps  35 lbs     Standing Pull Down  10 reps  20 lbs     Standing Rows  10 reps 25 lbs     Standing Pull Ups 10 reps  25 lbs      Passive stretching  Supine SKTC 30 sec hold x 3 reps with each leg  Supine DKTC 30 sec hold x 3 reps with each leg  Supine (L) IR stretch 30 sec hold x 3 reps   Supine (L) piriformis stretch 30 sec hold x 3 reps    Pt. Education:  [] Yes  [] No  [] Reviewed Prior HEP/Ed  Method of Education: [] Verbal  [] Demo  [] Written  HEP:   Comprehension of Education:  [] Verbalizes understanding. [] Demonstrates understanding. [] Needs review. [] Demonstrates/verbalizes HEP/Ed previously given. Assessment  Continued with exercise. Supine hook lying posterior pelvic tilt still painful to perform. However, initiated open chain hip strengthening. Poor motor control was shown within the lateral chain on the left. In addition, poor trunk stability was observed with the standing trunk stabilization exercises initiated today. Goals  LTG: (to be met in 16 treatments)  1. No complaints of pain will be reported within the (L) thumb or sacral region at rest/ADLs.   2. Quick DASH improved by 10 points.   3. Pt will demonstrate independence with her completed home exercise program at discharge.      Patient goals: To feel better     Plan: [x] Continue per plan of care.    [] Other:      Treatment Charges: Mins Units Time In/Out   [] Evaluation       []  Low       []  Moderate       []  High      []  Modalities        [x]  Ther Exercise 45 3 [id number]   []  Neuromuscular Re-ed      []  Gait Training      []  Manual Therapy      []  Ther Activities      []  Aquatics      []  Vasocompression      []  Cervical Traction      []  Other      Total Treatment time 45 min 3       Time In: 8026         Time Out: 1000    Electronically signed by:  Norah Carr PT DPT

## 2022-04-05 NOTE — PROGRESS NOTES
[x] [facility]) @ [facility]  [address]  [place], [address] [address]  Phone [phone]  Fax [phone]    Physical Therapy Discharge Note    Date: 2022      Patient: Jake Reid  : 1975  MRN: [id number]    Physician: Beatriz Ayers MD                                   Diagnosis: S39.012D (ICD-10-CM) - Strain of muscle, fascia and tendon of lower back, subsequent encounter, S16. 1XXD (ICD-10-CM) - Strain of muscle, fascia and tendon at neck level, subsequent encounter,S50.311D (ICD-10-CM) - Abrasion of right elbow, subsequent encounter, S63.602D (ICD-10-CM) - Unspecified sprain of left thumb, subsequent encounter,S30. 0XXD (ICD-10-CM) - Contusion of lower back and pelvis, subsequent encounter           Onset Date: 02/10/2022    Clinical  Diagnosis: Sacral pain with mobility deficits and (L) thumb pain  Total visits attended: 13   Cancels/No shows: 0/0  Date of initial visit: 2022                [] Patient recovered from conditions. Treatment goals were met. [] Patient received maximum benefit. No further therapy indicated at this time. [] Patient demonstrated improvement from condition with  ** Of  ** Short term goals met. []Patient demonstrated improvement from condition with **   Of **  Long term goals met. [] Patient to continue exercise/home instructions independently. [x] Therapy interrupted due to the patient contacting our facility and stating that her physician has discontinued her physical therapy. [] Patient has 2 or more no shows/cancels, is discontinued per our policy. [] Patient has completed prescribed number of treatment sessions. [] Other:      Pain level at evaluation was   5    /10 and at discharge was   3    /10    It Is My Understanding That The:  [] Patient returned to work. [] Patient demonstrated improved level of function. [] Patient returned to previous functional level.   [] Patient's current functional status is unknown due to no-shows  [] Other:     Recommendations/Comments:     Treatment Included:     [x] Therapeutic Exercise   75667  [] Iontophoresis: 4 mg/mL Dexamethasone Sodium Phosphate  mAmin  89604   [] Therapeutic Activity  39035 [] Vasopneumatic cold with compression  33254    [] Gait Training   09119 [] Ultrasound   77108   [] Neuromuscular Re-education  29147 [] Electrical Stimulation Unattended  28907   [] Manual Therapy  17040 [] Electrical Stimulation Attended  52742   [x] Instruction in HEP  [] Lumbar/Cervical Traction  80706   [] Aquatic Therapy   25419 [] Cold/hotpack    [] Massage   21650      [] Dry Needling, 1 or 2 muscles  30635   [] Biofeedback, first 15 minutes   14391  [] Biofeedback, additional 15 minutes   20882 [] Dry Needling, 3 or more muscles  49063                If you have any questions or concerns regarding this patient's care, please contact us.    Thank you for your referral.      Electronically signed by: Jazmyne Thomas PT DPT

## 2024-01-23 ENCOUNTER — APPOINTMENT (OUTPATIENT)
Dept: GENERAL RADIOLOGY | Age: 49
End: 2024-01-23

## 2024-01-23 ENCOUNTER — HOSPITAL ENCOUNTER (EMERGENCY)
Age: 49
Discharge: HOME | End: 2024-01-23
Attending: STUDENT IN AN ORGANIZED HEALTH CARE EDUCATION/TRAINING PROGRAM

## 2024-01-23 VITALS
SYSTOLIC BLOOD PRESSURE: 146 MMHG | HEIGHT: 65 IN | RESPIRATION RATE: 15 BRPM | DIASTOLIC BLOOD PRESSURE: 97 MMHG | BODY MASS INDEX: 20.83 KG/M2 | TEMPERATURE: 97.7 F | HEART RATE: 70 BPM | OXYGEN SATURATION: 98 % | WEIGHT: 125 LBS

## 2024-01-23 DIAGNOSIS — R07.89 CHEST WALL PAIN: ICD-10-CM

## 2024-01-23 DIAGNOSIS — R07.9 CHEST PAIN, UNSPECIFIED TYPE: Primary | ICD-10-CM

## 2024-01-23 LAB
ANION GAP SERPL CALCULATED.3IONS-SCNC: 8 MMOL/L (ref 9–17)
BACTERIA URNS QL MICRO: ABNORMAL
BASOPHILS # BLD: 0.1 K/UL (ref 0–0.2)
BASOPHILS NFR BLD: 1 % (ref 0–2)
BILIRUB UR QL STRIP: NEGATIVE
BNP SERPL-MCNC: 426 PG/ML
BUN SERPL-MCNC: 10 MG/DL (ref 6–20)
CA-I BLD-SCNC: 1.24 MMOL/L (ref 1.1–1.33)
CALCIUM SERPL-MCNC: 10.2 MG/DL (ref 8.6–10.4)
CASTS #/AREA URNS LPF: ABNORMAL /LPF
CHLORIDE SERPL-SCNC: 104 MMOL/L (ref 98–107)
CLARITY UR: CLEAR
CO2 SERPL-SCNC: 28 MMOL/L (ref 20–31)
COLOR UR: YELLOW
CREAT SERPL-MCNC: 0.7 MG/DL (ref 0.5–0.9)
EOSINOPHIL # BLD: 0.1 K/UL (ref 0–0.4)
EOSINOPHILS RELATIVE PERCENT: 1 % (ref 0–4)
EPI CELLS #/AREA URNS HPF: ABNORMAL /HPF
ERYTHROCYTE [DISTWIDTH] IN BLOOD BY AUTOMATED COUNT: 14 % (ref 11.5–14.9)
GFR SERPL CREATININE-BSD FRML MDRD: >60 ML/MIN/1.73M2
GLUCOSE SERPL-MCNC: 99 MG/DL (ref 70–99)
GLUCOSE UR STRIP-MCNC: NEGATIVE MG/DL
HCT VFR BLD AUTO: 45 % (ref 36–46)
HGB BLD-MCNC: 15.1 G/DL (ref 12–16)
HGB UR QL STRIP.AUTO: NEGATIVE
KETONES UR STRIP-MCNC: NEGATIVE MG/DL
LEUKOCYTE ESTERASE UR QL STRIP: NEGATIVE
LYMPHOCYTES NFR BLD: 3.3 K/UL (ref 1–4.8)
LYMPHOCYTES RELATIVE PERCENT: 32 % (ref 24–44)
MAGNESIUM SERPL-MCNC: 2.3 MG/DL (ref 1.6–2.6)
MCH RBC QN AUTO: 30 PG (ref 26–34)
MCHC RBC AUTO-ENTMCNC: 33.5 G/DL (ref 31–37)
MCV RBC AUTO: 89.5 FL (ref 80–100)
MONOCYTES NFR BLD: 0.8 K/UL (ref 0.1–1.3)
MONOCYTES NFR BLD: 8 % (ref 1–7)
NEUTROPHILS NFR BLD: 58 % (ref 36–66)
NEUTS SEG NFR BLD: 5.9 K/UL (ref 1.3–9.1)
NITRITE UR QL STRIP: NEGATIVE
PH UR STRIP: 6.5 [PH] (ref 5–8)
PLATELET # BLD AUTO: 295 K/UL (ref 150–450)
PMV BLD AUTO: 9.3 FL (ref 6–12)
POTASSIUM SERPL-SCNC: 4.1 MMOL/L (ref 3.7–5.3)
PROT UR STRIP-MCNC: NEGATIVE MG/DL
RBC # BLD AUTO: 5.03 M/UL (ref 4–5.2)
RBC #/AREA URNS HPF: ABNORMAL /HPF
SODIUM SERPL-SCNC: 140 MMOL/L (ref 135–144)
SP GR UR STRIP: 1.01 (ref 1–1.03)
TROPONIN I SERPL HS-MCNC: <6 NG/L (ref 0–14)
TROPONIN I SERPL HS-MCNC: <6 NG/L (ref 0–14)
TSH SERPL DL<=0.05 MIU/L-ACNC: 1.76 UIU/ML (ref 0.3–5)
UROBILINOGEN UR STRIP-ACNC: NORMAL EU/DL (ref 0–1)
WBC #/AREA URNS HPF: ABNORMAL /HPF
WBC OTHER # BLD: 10.3 K/UL (ref 3.5–11)

## 2024-01-23 PROCEDURE — 82330 ASSAY OF CALCIUM: CPT

## 2024-01-23 PROCEDURE — 99285 EMERGENCY DEPT VISIT HI MDM: CPT

## 2024-01-23 PROCEDURE — 85025 COMPLETE CBC W/AUTO DIFF WBC: CPT

## 2024-01-23 PROCEDURE — 80048 BASIC METABOLIC PNL TOTAL CA: CPT

## 2024-01-23 PROCEDURE — 81001 URINALYSIS AUTO W/SCOPE: CPT

## 2024-01-23 PROCEDURE — 84443 ASSAY THYROID STIM HORMONE: CPT

## 2024-01-23 PROCEDURE — 93005 ELECTROCARDIOGRAM TRACING: CPT | Performed by: EMERGENCY MEDICINE

## 2024-01-23 PROCEDURE — 83735 ASSAY OF MAGNESIUM: CPT

## 2024-01-23 PROCEDURE — 84484 ASSAY OF TROPONIN QUANT: CPT

## 2024-01-23 PROCEDURE — 71046 X-RAY EXAM CHEST 2 VIEWS: CPT

## 2024-01-23 PROCEDURE — 83880 ASSAY OF NATRIURETIC PEPTIDE: CPT

## 2024-01-23 PROCEDURE — 36415 COLL VENOUS BLD VENIPUNCTURE: CPT

## 2024-01-23 PROCEDURE — 6370000000 HC RX 637 (ALT 250 FOR IP)

## 2024-01-23 RX ORDER — IBUPROFEN 200 MG
400 TABLET ORAL ONCE
Status: COMPLETED | OUTPATIENT
Start: 2024-01-23 | End: 2024-01-23

## 2024-01-23 RX ORDER — ACETAMINOPHEN 500 MG
1000 TABLET ORAL ONCE
Status: COMPLETED | OUTPATIENT
Start: 2024-01-23 | End: 2024-01-23

## 2024-01-23 RX ADMIN — IBUPROFEN 400 MG: 200 TABLET, FILM COATED ORAL at 16:16

## 2024-01-23 RX ADMIN — ACETAMINOPHEN 1000 MG: 500 TABLET ORAL at 16:16

## 2024-01-23 ASSESSMENT — LIFESTYLE VARIABLES
HOW MANY STANDARD DRINKS CONTAINING ALCOHOL DO YOU HAVE ON A TYPICAL DAY: PATIENT DOES NOT DRINK
HOW OFTEN DO YOU HAVE A DRINK CONTAINING ALCOHOL: NEVER

## 2024-01-23 ASSESSMENT — PAIN - FUNCTIONAL ASSESSMENT: PAIN_FUNCTIONAL_ASSESSMENT: 0-10

## 2024-01-23 ASSESSMENT — PAIN SCALES - GENERAL
PAINLEVEL_OUTOF10: 7
PAINLEVEL_OUTOF10: 3
PAINLEVEL_OUTOF10: 7

## 2024-01-23 ASSESSMENT — HEART SCORE: ECG: 0

## 2024-01-23 NOTE — ED PROVIDER NOTES
[facility] ED  Emergency Department Encounter  Emergency Medicine Resident     Pt Name:Kim Fuentes  MRN: [id number]  Birthdate 1975  Date of evaluation: 1/23/24  PCP:  No primary care provider on file.  Note Started: 3:39 PM EST      CHIEF COMPLAINT       Chief Complaint   Patient presents with    Chest Pain       HISTORY OF PRESENT ILLNESS  (Location/Symptom, Timing/Onset, Context/Setting, Quality, Duration, Modifying Factors, Severity.)      Kim Fuentes is a 48-year-old female presents to the ED with complaint of chest pain that woke her up at approximately 2 AM this morning.  Patient states that the pain has been fluctuating since that time and had completely abated for about 1 hour and then returned during the last 14 hours before coming to the ED. Patient denies any continued acute shortness of breath, nausea, vomiting, abdominal pain.  No complaints of dysuria or vaginal discharge.  No reports of melanotic stool or bright red blood per rectum. She is status post hysterectomy without complications.  She states she has mild pain rating to the left upper extremity.  No reported pain going into her back.  She states the pain in her chest is midline.    She has a smoking history of half a pack per day for greater than 15 years.  No recreational drug use.  No reported alcohol use.    She denies any recent trauma to the chest wall.    NP at her work said she looked pale.    She has no history of myocardial infarction and does not take any medications for chronic health issues.  She reportedly states she is healthy.  Sister at bedside reports that there is a strong family history on her mother side of heart problems    PAST MEDICAL / SURGICAL / SOCIAL / FAMILY HISTORY      has a past medical history of Abdominal bloating, Fibromyalgia, History of chickenpox, Pneumonia, Prolonged emergence from general anesthesia, Uterine fibroid, and Vagina bleeding.       has a past surgical history that includes

## 2024-01-23 NOTE — ED TRIAGE NOTES
Mode of arrival (squad #, walk in, police, etc) : walk in        Chief complaint(s): chest pain        Arrival Note (brief scenario, treatment PTA, etc).: pt reports chest pain that radiates to her left arm and through her back. No cardiac hx. Pt states it started around 0200 and has progressed throughout the day. Pt reports pain on inspiration. Pt is a smoker        C= \"Have you ever felt that you should Cut down on your drinking?\"  No  A= \"Have people Annoyed you by criticizing your drinking?\"  No  G= \"Have you ever felt bad or Guilty about your drinking?\"  No  E= \"Have you ever had a drink as an Eye-opener first thing in the morning to steady your nerves or to help a hangover?\"  No      Deferred []      Reason for deferring: N/A    *If yes to two or more: probable alcohol abuse.*

## 2024-01-23 NOTE — DISCHARGE INSTRUCTIONS
-You were seen and evaluated by emergency medicine physicians at [facility].    -Please follow-up with your primary care physician and/or with the referrals to specialist.    -You were diagnosed with: Chest pain, chest wall pain    -Lab work and imaging was negative for any acute cardiopulmonary abnormality.  Your BNP was mildly elevated.  Please follow-up with your primary care physician for further monitoring.  If you do not have a primary care physician, a referral has been included in your discharge paperwork.  Please call and establish care    -Please return to the Emergency Department if you are experiencing the following symptoms acutely: Worsening chest pain, headache, fever, chills, nausea, vomiting, chest pain, shortness of breath, abdominal pain, change with urination, change with bowel movements, change in your skin/hair/nail, weakness, fatigue, altered mental status and/or any change from baseline health.    -Thank you for coming to [facility].

## 2024-01-23 NOTE — ED PROVIDER NOTES
[facility] ED  Emergency Department  Faculty Attestation       I performed a history and physical examination of the patient and discussed management with the resident. I reviewed the resident’s note and agree with the documented findings including all diagnostic interpretations and plan of care. Any areas of disagreement are noted on the chart. I was personally present for the key portions of any procedures. I have documented in the chart those procedures where I was not present during the key portions. I have reviewed the emergency nurses triage note. I agree with the chief complaint, past medical history, past surgical history, allergies, medications, social and family history as documented unless otherwise noted below. Documentation of the HPI, Physical Exam and Medical Decision Making performed by narcisoibbubba is based on my personal performance of the HPI, PE and MDM. For Physician Assistant/ Nurse Practitioner cases/documentation I have personally evaluated this patient and have completed at least one if not all key elements of the E/M (history, physical exam, and MDM). Additional findings are as noted.    Pertinent Comments     Primary Care Physician: No primary care provider on file.    History: This is a 48 y.o. female who presents to the Emergency Department with complaint of    Chief Complaint   Patient presents with    Chest Pain         Physical:    ED Triage Vitals [01/23/24 1523]   BP Temp Temp Source Pulse Respirations SpO2 Height Weight - Scale   (!) 155/102 97.7 °F (36.5 °C) Oral 97 18 99 % 1.651 m (5' 5\") 56.7 kg (125 lb)        RADIOLOGY:  No results found.    MDM/Plan:   See ED course.  Chest pain woke her up at 2 AM.  Intermittently present since then.      EKG Interpretation    Interpreted by me  Sinus rhythm rate of 92.  No ST segment changes, no arrhythmia, no ectopy.  Normal axis, good R wave progression.  Low QRS voltage in aVL.  T wave inversion in lead III.  This appears to be old

## 2024-01-24 LAB
EKG ATRIAL RATE: 92 BPM
EKG P AXIS: 62 DEGREES
EKG P-R INTERVAL: 114 MS
EKG Q-T INTERVAL: 358 MS
EKG QRS DURATION: 80 MS
EKG QTC CALCULATION (BAZETT): 442 MS
EKG R AXIS: 42 DEGREES
EKG T AXIS: 35 DEGREES
EKG VENTRICULAR RATE: 92 BPM

## 2024-01-24 PROCEDURE — 93010 ELECTROCARDIOGRAM REPORT: CPT | Performed by: INTERNAL MEDICINE

## 2025-03-11 ENCOUNTER — HOSPITAL ENCOUNTER (EMERGENCY)
Age: 50
Discharge: HOME | End: 2025-03-11
Attending: STUDENT IN AN ORGANIZED HEALTH CARE EDUCATION/TRAINING PROGRAM

## 2025-03-11 ENCOUNTER — APPOINTMENT (OUTPATIENT)
Dept: GENERAL RADIOLOGY | Age: 50
End: 2025-03-11

## 2025-03-11 ENCOUNTER — APPOINTMENT (OUTPATIENT)
Dept: CT IMAGING | Age: 50
End: 2025-03-11

## 2025-03-11 VITALS
HEART RATE: 98 BPM | DIASTOLIC BLOOD PRESSURE: 95 MMHG | WEIGHT: 111 LBS | HEIGHT: 65 IN | RESPIRATION RATE: 16 BRPM | BODY MASS INDEX: 18.49 KG/M2 | TEMPERATURE: 97.7 F | SYSTOLIC BLOOD PRESSURE: 127 MMHG | OXYGEN SATURATION: 97 %

## 2025-03-11 DIAGNOSIS — T07.XXXA MULTIPLE CONTUSIONS: ICD-10-CM
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
## 2025-03-11 DIAGNOSIS — S62.102A CLOSED FRACTURE OF LEFT WRIST, INITIAL ENCOUNTER: ICD-10-CM

## 2025-03-11 PROCEDURE — 29125 APPL SHORT ARM SPLINT STATIC: CPT | Performed by: STUDENT IN AN ORGANIZED HEALTH CARE EDUCATION/TRAINING PROGRAM

## 2025-03-11 PROCEDURE — 6360000002 HC RX W HCPCS

## 2025-03-11 PROCEDURE — 70450 CT HEAD/BRAIN W/O DYE: CPT

## 2025-03-11 PROCEDURE — 99284 EMERGENCY DEPT VISIT MOD MDM: CPT | Performed by: STUDENT IN AN ORGANIZED HEALTH CARE EDUCATION/TRAINING PROGRAM

## 2025-03-11 PROCEDURE — 73502 X-RAY EXAM HIP UNI 2-3 VIEWS: CPT

## 2025-03-11 PROCEDURE — 73610 X-RAY EXAM OF ANKLE: CPT

## 2025-03-11 PROCEDURE — 6370000000 HC RX 637 (ALT 250 FOR IP)

## 2025-03-11 PROCEDURE — 96372 THER/PROPH/DIAG INJ SC/IM: CPT | Performed by: STUDENT IN AN ORGANIZED HEALTH CARE EDUCATION/TRAINING PROGRAM

## 2025-03-11 PROCEDURE — 73110 X-RAY EXAM OF WRIST: CPT

## 2025-03-11 PROCEDURE — 73630 X-RAY EXAM OF FOOT: CPT

## 2025-03-11 RX ORDER — OXYCODONE AND ACETAMINOPHEN 5; 325 MG/1; MG/1
1 TABLET ORAL ONCE
Refills: 0 | Status: COMPLETED | OUTPATIENT
Start: 2025-03-11 | End: 2025-03-11

## 2025-03-11 RX ORDER — HYDROCODONE BITARTRATE AND ACETAMINOPHEN 5; 325 MG/1; MG/1
1 TABLET ORAL EVERY 4 HOURS PRN
Qty: 18 TABLET | Refills: 0 | Status: SHIPPED | OUTPATIENT
Start: 2025-03-11 | End: 2025-03-14

## 2025-03-11 RX ORDER — KETOROLAC TROMETHAMINE 30 MG/ML
30 INJECTION, SOLUTION INTRAMUSCULAR; INTRAVENOUS ONCE
Status: COMPLETED | OUTPATIENT
Start: 2025-03-11 | End: 2025-03-11

## 2025-03-11 RX ADMIN — KETOROLAC TROMETHAMINE 30 MG: 30 INJECTION, SOLUTION INTRAMUSCULAR at 14:30

## 2025-03-11 RX ADMIN — OXYCODONE HYDROCHLORIDE AND ACETAMINOPHEN 1 TABLET: 5; 325 TABLET ORAL at 14:30

## 2025-03-11 ASSESSMENT — PAIN SCALES - GENERAL
PAINLEVEL_OUTOF10: 2
PAINLEVEL_OUTOF10: 10

## 2025-03-11 ASSESSMENT — PAIN - FUNCTIONAL ASSESSMENT: PAIN_FUNCTIONAL_ASSESSMENT: 0-10

## 2025-03-11 NOTE — DISCHARGE INSTRUCTIONS
Use rest, ice wear splint and take over-the-counter Tylenol or ibuprofen for mild to moderate pain, use Norco for more severe pain.  Wear sling for comfort.    Primary care resources for uninsured:  [facility], [facility]

## 2025-03-11 NOTE — ED PROVIDER NOTES
[facility] Emergency Department  [address]  [place] [address]  Phone: [phone]  Fax: [phone]      Patient Name:  Kim Fuentes  Medical Record Number:  [id number]  YOB: 1975  Date of Service:  3/11/2025  Primary Care Physician:  No primary care provider on file.      CHIEF COMPLAINT:       Chief Complaint   Patient presents with    Fall       HISTORY OF PRESENT ILLNESS:    Kim Fuentes is a 49 y.o. female who presents with the complaint of fall with multiple injuries.  Incident occurred 15 minutes prior to arrival.  She was walking her to [facility] when there chains wrapped around both of her ankles causing her to fall onto the right side of her body.  She did hit the right side of her head and has a hematoma denies LOC.  She has pain and deformity to the right wrist she has pain to the right hip without shortening and rotation, she does have abrasion and pain to the right fourth and fifth toe and pain to both ankles with abrasions around the ankles.  She is not on blood thinners    CURRENT MEDICATIONS:      Discharge Medication List as of 3/11/2025  3:11 PM        CONTINUE these medications which have NOT CHANGED    Details   acetaminophen (TYLENOL) 500 MG tablet Take 2 tablets by mouth every 6 hours as needed for Pain, Disp-60 tablet, R-0Normal      naproxen (NAPROSYN) 500 MG tablet Take 1 tablet by mouth 2 times daily, Disp-20 tablet, R-0Normal      albuterol sulfate HFA (VENTOLIN HFA) 108 (90 Base) MCG/ACT inhaler Inhale 2 puffs into the lungs 4 times daily as needed for Wheezing, Disp-3 Inhaler, R-1Print      loratadine (CLARITIN) 10 MG tablet Take 1 tablet by mouth daily, Disp-30 tablet, R-0Print      nicotine polacrilex (NICORETTE) 2 MG gum Take 1 each by mouth as needed for Smoking cessation, Disp-110 each, R-3Normal             ALLERGIES:   is allergic to bactrim [sulfamethoxazole-trimethoprim] and succinylcholine.    PAST MEDICAL HISTORY:    has a past

## 2025-03-11 NOTE — ED PROVIDER NOTES
[facility] Emergency Department  [address]  [place] [address]  Phone: [phone]  Fax: [phone]      Attending Physician Attestation    I personally made/approves the management plan for this patient's and take responsibility for the patient management.    49-year-old female presents to the emergency department with fall, multiple areas of pain.  Was walking her dog when the chain wrapped around her ankles causing her to fall, found to have a wrist fracture.  Mildly displaced.  Other imaging unremarkable, plan for splint and orthopedic follow-up.       (Please note that portions of this note were completed with a voice recognition program.  Efforts were made to edit the dictations but occasionally words are mis-transcribed.)    Leah Leija MD  Attending Emergency Medicine Physician       Leah Leija MD  03/12/25 0057

## 2025-03-12 ENCOUNTER — OFFICE VISIT (OUTPATIENT)
Dept: ORTHOPEDIC SURGERY | Age: 50
End: 2025-03-12

## 2025-03-12 ENCOUNTER — PREP FOR PROCEDURE (OUTPATIENT)
Dept: ORTHOPEDIC SURGERY | Age: 50
End: 2025-03-12

## 2025-03-12 VITALS — HEIGHT: 65 IN | WEIGHT: 111 LBS | BODY MASS INDEX: 18.49 KG/M2

## 2025-03-12 DIAGNOSIS — S52.501A CLOSED FRACTURE OF DISTAL END OF RIGHT RADIUS, UNSPECIFIED FRACTURE MORPHOLOGY, INITIAL ENCOUNTER: Primary | ICD-10-CM

## 2025-03-12 DIAGNOSIS — S92.514A CLOSED NONDISPLACED FRACTURE OF PROXIMAL PHALANX OF LESSER TOE OF RIGHT FOOT, INITIAL ENCOUNTER: ICD-10-CM

## 2025-03-12 PROCEDURE — 99204 OFFICE O/P NEW MOD 45 MIN: CPT

## 2025-03-12 NOTE — PROGRESS NOTES
ORTHOPEDIC PATIENT EVALUATION - New Patient; ED Follow-Up      HPI / Chief Complaint  Kim Fuentes is a 49 y.o. female who presents for evaluation of right wrist and right foot pain.  Patient states that yesterday she was outside with her 2 dogs and they were on change which got wrapped around her bilateral ankles causing her to fall onto her front side as well as on an outstretched hand.  States that the pain today is localized to her right wrist as well as her right foot.  She did present to the ED the same day and was put in a sugar-tong splint to her right upper extremity as well as advised to follow-up in our clinic for further treatment recommendations.  She states the pain does not get much better although has not having to take any medications to help with pain control.  She did also hit her head at this time but denies any loss of consciousness.  She denies any numbness or tingling.  Denies any pain at the shoulder or elbow.    Past Medical History  Kim  has a past medical history of Abdominal bloating, Fibromyalgia, History of chickenpox, Pneumonia, Prolonged emergence from general anesthesia, Uterine fibroid, Vagina bleeding, and Wrist fracture.    Past Surgical History  Kim  has a past surgical history that includes Foot surgery (Left); Tubal ligation (1995); laparoscopy (03/03/2016); and pr laps vaginal hysterectomy uterus 250 gm/< (N/A, 04/19/2018).    Current Medications  Current Outpatient Medications   Medication Sig Dispense Refill    HYDROcodone-acetaminophen (NORCO) 5-325 MG per tablet Take 1 tablet by mouth every 4 hours as needed for Pain for up to 3 days. Intended supply: 3 days. Take lowest dose possible to manage pain Max Daily Amount: 6 tablets 18 tablet 0    acetaminophen (TYLENOL) 500 MG tablet Take 2 tablets by mouth every 6 hours as needed for Pain 60 tablet 0     No current facility-administered medications for this visit.     Allergies  Allergies have been reviewed.  Kim is

## 2025-03-12 NOTE — PROGRESS NOTES
DAY OF SURGERY/PROCEDURE  GUIDELINES    As a patient at the [facility], you can expect quality medical and nursing care that is centered on your individual needs. It is our goal to make your surgical experience as comfortable and excellent as possible.  ________________________________________________________________________    The following instructions are general guidelines, if any information on this sheet is different from what your doctor has instructed you to do, please follow your doctor's instructions.    Please arrive on 3/14/2025 @ 1130      Enter through entrance C. Check in at registration     Upon arrival you will be taken to the pre-operative area to get ready for surgery, your family will stay in the waiting room and visit with you once you are ready for surgery. Due to special limitations please limit visitation to 1-2 members of your family at a time. When it is time for surgery your family will return to the waiting room.    Nothing to eat, drink, smoke, suck or chew after midnight (no water, gum, mints, cigarettes, cigars, pipes, snuff, chewing tobacco, etc.) or your surgery may be canceled.     Take a shower or bath on the morning of your surgery/procedure (Hibiclens if directed) Do not apply any lotions.    Brush your teeth, but do not swallow any water    IN CASE OF ILLNESS - If you have a cold or flu symptoms (high fever, runny nose, sore throat, cough, etc.) rash, nausea, vomiting, loose stools, and/or recent contact with someone who has a contagious disease (chick pox, measles, etc.) please call your doctor before coming to the surgery center    Take a small sip of water with     If applicable bring your:  Inhaler (s)  Hearing aid(s)  Eyeglasses and Case (If you wear contacts they have to be removed before surgery, bring case and solution)  CPAP     DO NOT take anticoagulants (blood thinners, aspirin or aspirin-containing products) as instructed by your

## 2025-03-12 NOTE — PROGRESS NOTES
Patient has an x-ray discrepancy where radiologist reports a mildly displaced fracture at the fifth proximal phalanx base of the right foot.  Patient will be offered a postop shoe.  This fracture does not require any other intervention other than treatment of pain and she is already provided prescription for pain meds.  She may follow-up as previously directed.

## 2025-03-13 ENCOUNTER — ANESTHESIA EVENT (OUTPATIENT)
Dept: OPERATING ROOM | Age: 50
End: 2025-03-13

## 2025-03-13 NOTE — ED NOTES
Spoke with patient who was notified about XR results, ortho team gave her a walking boot yesterday.

## 2025-03-14 ENCOUNTER — HOSPITAL ENCOUNTER (OUTPATIENT)
Age: 50
Setting detail: OUTPATIENT SURGERY
Discharge: HOME | End: 2025-03-14
Attending: ORTHOPAEDIC SURGERY | Admitting: ORTHOPAEDIC SURGERY

## 2025-03-14 ENCOUNTER — APPOINTMENT (OUTPATIENT)
Dept: GENERAL RADIOLOGY | Age: 50
End: 2025-03-14
Attending: ORTHOPAEDIC SURGERY

## 2025-03-14 ENCOUNTER — ANESTHESIA (OUTPATIENT)
Dept: OPERATING ROOM | Age: 50
End: 2025-03-14

## 2025-03-14 VITALS
WEIGHT: 110 LBS | TEMPERATURE: 97.1 F | HEIGHT: 65 IN | DIASTOLIC BLOOD PRESSURE: 93 MMHG | BODY MASS INDEX: 18.33 KG/M2 | OXYGEN SATURATION: 93 % | HEART RATE: 105 BPM | SYSTOLIC BLOOD PRESSURE: 139 MMHG | RESPIRATION RATE: 19 BRPM

## 2025-03-14 DIAGNOSIS — S52.501A CLOSED FRACTURE OF DISTAL END OF RIGHT RADIUS, UNSPECIFIED FRACTURE MORPHOLOGY, INITIAL ENCOUNTER: ICD-10-CM

## 2025-03-14 DIAGNOSIS — Z87.81 S/P ORIF (OPEN REDUCTION INTERNAL FIXATION) FRACTURE: Primary | ICD-10-CM

## 2025-03-14 DIAGNOSIS — Z98.890 S/P ORIF (OPEN REDUCTION INTERNAL FIXATION) FRACTURE: Primary | ICD-10-CM

## 2025-03-14 PROCEDURE — 6360000002 HC RX W HCPCS: Performed by: NURSE ANESTHETIST, CERTIFIED REGISTERED

## 2025-03-14 PROCEDURE — 3700000000 HC ANESTHESIA ATTENDED CARE: Performed by: ORTHOPAEDIC SURGERY

## 2025-03-14 PROCEDURE — 3600000004 HC SURGERY LEVEL 4 BASE: Performed by: ORTHOPAEDIC SURGERY

## 2025-03-14 PROCEDURE — 7100000010 HC PHASE II RECOVERY - FIRST 15 MIN: Performed by: ORTHOPAEDIC SURGERY

## 2025-03-14 PROCEDURE — 25608 OPTX DST RD XART FX/EPI SEP2: CPT | Performed by: ORTHOPAEDIC SURGERY

## 2025-03-14 PROCEDURE — 2709999900 HC NON-CHARGEABLE SUPPLY: Performed by: ORTHOPAEDIC SURGERY

## 2025-03-14 PROCEDURE — 64417 NJX AA&/STRD AX NERVE IMG: CPT | Performed by: ANESTHESIOLOGY

## 2025-03-14 PROCEDURE — 6360000002 HC RX W HCPCS: Performed by: ANESTHESIOLOGY

## 2025-03-14 PROCEDURE — 6360000002 HC RX W HCPCS

## 2025-03-14 PROCEDURE — 2580000003 HC RX 258: Performed by: ANESTHESIOLOGY

## 2025-03-14 PROCEDURE — 7100000001 HC PACU RECOVERY - ADDTL 15 MIN: Performed by: ORTHOPAEDIC SURGERY

## 2025-03-14 PROCEDURE — 7100000000 HC PACU RECOVERY - FIRST 15 MIN: Performed by: ORTHOPAEDIC SURGERY

## 2025-03-14 PROCEDURE — 2780000010 HC IMPLANT OTHER: Performed by: ORTHOPAEDIC SURGERY

## 2025-03-14 PROCEDURE — 3600000014 HC SURGERY LEVEL 4 ADDTL 15MIN: Performed by: ORTHOPAEDIC SURGERY

## 2025-03-14 PROCEDURE — 6360000002 HC RX W HCPCS: Performed by: ORTHOPAEDIC SURGERY

## 2025-03-14 PROCEDURE — 7100000011 HC PHASE II RECOVERY - ADDTL 15 MIN: Performed by: ORTHOPAEDIC SURGERY

## 2025-03-14 PROCEDURE — C1713 ANCHOR/SCREW BN/BN,TIS/BN: HCPCS | Performed by: ORTHOPAEDIC SURGERY

## 2025-03-14 PROCEDURE — 3700000001 HC ADD 15 MINUTES (ANESTHESIA): Performed by: ORTHOPAEDIC SURGERY

## 2025-03-14 DEVICE — 2.5 ADAPTIVE II TRILOCK DISTRAD.PL VOL R
Type: IMPLANTABLE DEVICE | Site: WRIST | Status: FUNCTIONAL
Brand: APTUS

## 2025-03-14 RX ORDER — ONDANSETRON 2 MG/ML
INJECTION INTRAMUSCULAR; INTRAVENOUS
Status: DISCONTINUED | OUTPATIENT
Start: 2025-03-14 | End: 2025-03-14 | Stop reason: SDUPTHER

## 2025-03-14 RX ORDER — SODIUM CHLORIDE 0.9 % (FLUSH) 0.9 %
5-40 SYRINGE (ML) INJECTION PRN
Status: DISCONTINUED | OUTPATIENT
Start: 2025-03-14 | End: 2025-03-14 | Stop reason: HOSPADM

## 2025-03-14 RX ORDER — LIDOCAINE HYDROCHLORIDE 10 MG/ML
INJECTION, SOLUTION EPIDURAL; INFILTRATION; INTRACAUDAL; PERINEURAL
Status: DISCONTINUED | OUTPATIENT
Start: 2025-03-14 | End: 2025-03-14 | Stop reason: SDUPTHER

## 2025-03-14 RX ORDER — LIDOCAINE HYDROCHLORIDE 10 MG/ML
1 INJECTION, SOLUTION EPIDURAL; INFILTRATION; INTRACAUDAL; PERINEURAL
Status: DISCONTINUED | OUTPATIENT
Start: 2025-03-14 | End: 2025-03-14 | Stop reason: HOSPADM

## 2025-03-14 RX ORDER — CELECOXIB 100 MG/1
100 CAPSULE ORAL 2 TIMES DAILY
Qty: 10 CAPSULE | Refills: 0 | Status: SHIPPED | OUTPATIENT
Start: 2025-03-14

## 2025-03-14 RX ORDER — HYDROCODONE BITARTRATE AND ACETAMINOPHEN 5; 325 MG/1; MG/1
1 TABLET ORAL EVERY 4 HOURS PRN
Qty: 42 TABLET | Refills: 0 | Status: SHIPPED | OUTPATIENT
Start: 2025-03-14 | End: 2025-03-21

## 2025-03-14 RX ORDER — MIDAZOLAM HYDROCHLORIDE 1 MG/ML
INJECTION, SOLUTION INTRAMUSCULAR; INTRAVENOUS
Status: COMPLETED
Start: 2025-03-14 | End: 2025-03-14

## 2025-03-14 RX ORDER — LIDOCAINE HYDROCHLORIDE 20 MG/ML
INJECTION, SOLUTION INFILTRATION; PERINEURAL
Status: COMPLETED | OUTPATIENT
Start: 2025-03-14 | End: 2025-03-14

## 2025-03-14 RX ORDER — ROPIVACAINE HYDROCHLORIDE 5 MG/ML
INJECTION, SOLUTION EPIDURAL; INFILTRATION; PERINEURAL
Status: COMPLETED | OUTPATIENT
Start: 2025-03-14 | End: 2025-03-14

## 2025-03-14 RX ORDER — FENTANYL CITRATE 50 UG/ML
INJECTION, SOLUTION INTRAMUSCULAR; INTRAVENOUS
Status: COMPLETED | OUTPATIENT
Start: 2025-03-14 | End: 2025-03-14

## 2025-03-14 RX ORDER — PROPOFOL 10 MG/ML
INJECTION, EMULSION INTRAVENOUS
Status: DISCONTINUED | OUTPATIENT
Start: 2025-03-14 | End: 2025-03-14 | Stop reason: SDUPTHER

## 2025-03-14 RX ORDER — MIDAZOLAM HYDROCHLORIDE 2 MG/2ML
2 INJECTION, SOLUTION INTRAMUSCULAR; INTRAVENOUS ONCE
Status: COMPLETED | OUTPATIENT
Start: 2025-03-14 | End: 2025-03-14

## 2025-03-14 RX ORDER — SODIUM CHLORIDE, SODIUM LACTATE, POTASSIUM CHLORIDE, CALCIUM CHLORIDE 600; 310; 30; 20 MG/100ML; MG/100ML; MG/100ML; MG/100ML
INJECTION, SOLUTION INTRAVENOUS CONTINUOUS
Status: DISCONTINUED | OUTPATIENT
Start: 2025-03-14 | End: 2025-03-14 | Stop reason: HOSPADM

## 2025-03-14 RX ORDER — FENTANYL CITRATE 50 UG/ML
100 INJECTION, SOLUTION INTRAMUSCULAR; INTRAVENOUS ONCE
Status: COMPLETED | OUTPATIENT
Start: 2025-03-14 | End: 2025-03-14

## 2025-03-14 RX ORDER — GABAPENTIN 300 MG/1
300 CAPSULE ORAL NIGHTLY
Qty: 7 CAPSULE | Refills: 0 | Status: SHIPPED | OUTPATIENT
Start: 2025-03-14 | End: 2025-03-21

## 2025-03-14 RX ORDER — SODIUM CHLORIDE 0.9 % (FLUSH) 0.9 %
5-40 SYRINGE (ML) INJECTION EVERY 12 HOURS SCHEDULED
Status: DISCONTINUED | OUTPATIENT
Start: 2025-03-14 | End: 2025-03-14 | Stop reason: HOSPADM

## 2025-03-14 RX ORDER — FENTANYL CITRATE 50 UG/ML
INJECTION, SOLUTION INTRAMUSCULAR; INTRAVENOUS
Status: COMPLETED
Start: 2025-03-14 | End: 2025-03-14

## 2025-03-14 RX ORDER — SODIUM CHLORIDE 9 MG/ML
INJECTION, SOLUTION INTRAVENOUS PRN
Status: DISCONTINUED | OUTPATIENT
Start: 2025-03-14 | End: 2025-03-14 | Stop reason: HOSPADM

## 2025-03-14 RX ORDER — MIDAZOLAM HYDROCHLORIDE 1 MG/ML
INJECTION, SOLUTION INTRAMUSCULAR; INTRAVENOUS
Status: COMPLETED | OUTPATIENT
Start: 2025-03-14 | End: 2025-03-14

## 2025-03-14 RX ORDER — DEXAMETHASONE SODIUM PHOSPHATE 10 MG/ML
INJECTION, SOLUTION INTRAMUSCULAR; INTRAVENOUS
Status: COMPLETED | OUTPATIENT
Start: 2025-03-14 | End: 2025-03-14

## 2025-03-14 RX ADMIN — FENTANYL CITRATE 25 MCG: 50 INJECTION, SOLUTION INTRAMUSCULAR; INTRAVENOUS at 15:14

## 2025-03-14 RX ADMIN — LIDOCAINE HYDROCHLORIDE 50 MG: 10 INJECTION, SOLUTION EPIDURAL; INFILTRATION; INTRACAUDAL; PERINEURAL at 14:53

## 2025-03-14 RX ADMIN — ONDANSETRON 4 MG: 2 INJECTION, SOLUTION INTRAMUSCULAR; INTRAVENOUS at 16:06

## 2025-03-14 RX ADMIN — FENTANYL CITRATE 100 MCG: 50 INJECTION, SOLUTION INTRAMUSCULAR; INTRAVENOUS at 13:20

## 2025-03-14 RX ADMIN — SODIUM CHLORIDE, POTASSIUM CHLORIDE, SODIUM LACTATE AND CALCIUM CHLORIDE: 600; 310; 30; 20 INJECTION, SOLUTION INTRAVENOUS at 16:17

## 2025-03-14 RX ADMIN — LIDOCAINE HYDROCHLORIDE 15 ML: 20 INJECTION, SOLUTION INFILTRATION; PERINEURAL at 13:27

## 2025-03-14 RX ADMIN — MIDAZOLAM 2 MG: 1 INJECTION INTRAMUSCULAR; INTRAVENOUS at 13:27

## 2025-03-14 RX ADMIN — FENTANYL CITRATE 25 MCG: 50 INJECTION, SOLUTION INTRAMUSCULAR; INTRAVENOUS at 15:19

## 2025-03-14 RX ADMIN — FENTANYL CITRATE 25 MCG: 50 INJECTION, SOLUTION INTRAMUSCULAR; INTRAVENOUS at 15:07

## 2025-03-14 RX ADMIN — MIDAZOLAM HYDROCHLORIDE 2 MG: 2 INJECTION, SOLUTION INTRAMUSCULAR; INTRAVENOUS at 13:20

## 2025-03-14 RX ADMIN — DEXAMETHASONE SODIUM PHOSPHATE 8 MG: 10 INJECTION, SOLUTION INTRAMUSCULAR; INTRAVENOUS at 13:27

## 2025-03-14 RX ADMIN — FENTANYL CITRATE 100 MCG: 50 INJECTION, SOLUTION INTRAMUSCULAR; INTRAVENOUS at 13:27

## 2025-03-14 RX ADMIN — ROPIVACAINE HYDROCHLORIDE 15 ML: 5 INJECTION, SOLUTION EPIDURAL; INFILTRATION; PERINEURAL at 13:27

## 2025-03-14 RX ADMIN — FENTANYL CITRATE 25 MCG: 50 INJECTION, SOLUTION INTRAMUSCULAR; INTRAVENOUS at 14:58

## 2025-03-14 RX ADMIN — SODIUM CHLORIDE, POTASSIUM CHLORIDE, SODIUM LACTATE AND CALCIUM CHLORIDE: 600; 310; 30; 20 INJECTION, SOLUTION INTRAVENOUS at 12:44

## 2025-03-14 RX ADMIN — MIDAZOLAM HYDROCHLORIDE 2 MG: 1 INJECTION, SOLUTION INTRAMUSCULAR; INTRAVENOUS at 13:20

## 2025-03-14 RX ADMIN — Medication 2000 MG: at 15:03

## 2025-03-14 RX ADMIN — PROPOFOL 150 MG: 10 INJECTION, EMULSION INTRAVENOUS at 14:53

## 2025-03-14 ASSESSMENT — LIFESTYLE VARIABLES: SMOKING_STATUS: 1

## 2025-03-14 ASSESSMENT — PAIN - FUNCTIONAL ASSESSMENT: PAIN_FUNCTIONAL_ASSESSMENT: 0-10

## 2025-03-14 NOTE — H&P
Update History & Physical    The patient's History and Physical of March 12, 2025 was reviewed with the patient and I examined the patient. There was no change. The surgical site was confirmed by the patient and me.   Heart: RRR  Lungs: CTA bilaterally    Plan: The risks, benefits, expected outcome, and alternative to the recommended procedure have been discussed with the patient. Patient understands and wants to proceed with the procedure.     Electronically signed by Mary Byrne MD on 3/14/2025 at 2:19 PM

## 2025-03-14 NOTE — DISCHARGE INSTRUCTIONS
pain medication use. Do not take more than 2400mg in a day.  Please resume all home medications, unless instructed otherwise.    ACTIVITY  Keep the limb elevated for several days following surgery to decrease swelling.  Do not engage in activities which increase pain/swelling such as squatting, running,  Lifting, pushing or pulling for at least 6 weeks following surgery.  NO driving while taking narcotic medications or until instructed otherwise by physician.  May return to work or school 3-4 days after surgery, if pain is tolerable without use of your operative extremity.    SPLINT  You were placed in a splint to protect the wrist and surgical site while the incision heals.  This should remain on, intact and kept dry until your 2-week postop appointment.    ICE THERAPY  Begin immediately after surgery.  Do not place ice directly on the skin (place over an Ace wrap or thin clothing).  Use icing machine continuously or ice packs (if machine not prescribed) every 2  hours for 20 minutes daily for the first week.    EXERCISE  On the initial post-operative visit, you will be shown gentle range of motion exercises which you should perform at least 3 times per day.  Formal occupational therapy (OT) may begin about 2 weeks post-operatively with a prescription provided at that post-operative visit if deemed necessary.    EMERGENCIES  Contact Dr. Byrne or his nurse at [phone] if any of the following are present: Painful swelling or numbness, Unrelenting pain, Fever (over 101 - it is normal to have a low grade fever for the first day or two following surgery) or chills, Redness around incisions, Color change in foot or toes, Continuous drainage or bleeding from incision (a small amount of drainage is expected), Difficulty breathing, Excessive nausea/vomiting  **If you have an emergency after office hours or on the weekend, contact the same office number ([phone]) and you will be connected to our page  service - they  will contact Dr. Byrne or his partner if he is unavailable. Do NOT call the hospital or surgicenter.  **If you have an emergency that requires immediate attention, proceed to the nearest emergency room.    FOLLOW-UP CARE / QUESTIONS  If you have any questions/concerns, please call the office [phone].  Contact the office to confirm/schedule your post-op visits for 2 weeks from surgery.       Activity  You have had anesthesia today  Do not drive, operate heavy equipment, consume alcoholic beverages, or make any important decisions  for 24 hours   If you are taking pain medication: Do not drive or consume alcohol.  Take your time changing positions today. You may feel light headed or dizzy if you move too quickly.   Continue your home medications as ordered by your physician.  Diet   You can eat your normal diet when you feel well. You should start off with bland foods like chicken soup, toast, or yogurt. Then advance as tolerated.  Drink plenty of fluids (unless your doctor tells you not to). Your urine should be very lightly colored without a strong odor.

## 2025-03-14 NOTE — OP NOTE
.OPERATIVE REPORT    Date of Surgery: 3/14/2025    Pre-operative Diagnosis: Right distal radius fracture    Post-operative Diagnosis: Right distal radius fracture     Procedure: Open reduction internal fixation right intra-articular distal radius fracture (2 fragments)    Surgeon(s): Mary Byrne MD    Assistant(s): No resident present. Conner Gonzalez PA-C, medically necessary for patient positioning, surgical site exposure, wound closure, and efficient completion of case.      Anesthesia: General    Fluids: See anesthesia record    Urine output: See anesthesia record    Estimated blood loss: Minimal    Findings: Displaced intra-articular distal radius fracture    Specimen: None    Tourniquet time: 34 minutes    Implants: Medium [employer] distal radius volar locking variable angle plate with 2.5 mm cortical and locking screws    Surgical Indications:  Kim Fuentes is a 49 y.o. old female who presented with a closed but displaced right distal radius fracture. Following a discussion with the patient regarding both non-operative and operative treatment options, she elected and consented to proceed with an ORIF of her right distal radius fracture. She came to this decision after demonstrating an understanding of our discussion regarding details of the procedure, risks and benefits, expected outcome, and postoperative course.    Operative technique:     Following appropriate identification of the patient and her operative extremity, consent was reviewed with the patient and Her operative extremity was signed. She was wheeled to the operating room where she finished a course of pre-operative antibiotic prophylaxis by way of 2 g of IV Ancef. The anesthesia service administered a general anesthetic and secured her airway. All bony prominences were appropriately padded and the patient was secured to the operative table in a supine position.  A well-padded pneumatic tourniquet was applied to her right upper arm.  The patient's  for subcutaneous closure and 3-0 Prolene for subcuticular closure.  Sterile dressings were applied using Steri-Strips, 4 x 4 gauze and Tegaderm.  Padded volar splint was applied spanning her wrist.  This was overwrapped with Ace bandage.  She was awoken, extubated, transferred to her bed and wheeled to recovery in stable condition.    Complications: None    Post-operative Condition: Stable

## 2025-03-14 NOTE — ANESTHESIA PROCEDURE NOTES
Peripheral Block    Patient location during procedure: pre-op  Reason for block: post-op pain management and at surgeon's request  Start time: 3/14/2025 1:27 PM  End time: 3/14/2025 1:30 PM  Staffing  Performed: anesthesiologist   Anesthesiologist: Tamym Abdi MD  Performed by: Tammy Abdi MD  Authorized by: Tammy Abdi MD    Preanesthetic Checklist  Completed: patient identified, IV checked, site marked, risks and benefits discussed, surgical/procedural consents, equipment checked, pre-op evaluation, timeout performed, anesthesia consent given, oxygen available, monitors applied/VS acknowledged, fire risk safety assessment completed and verbalized and blood product R/B/A discussed and consented  Peripheral Block   Patient position: supine  Prep: ChloraPrep  Provider prep: mask and sterile gloves  Patient monitoring: cardiac monitor, continuous pulse ox, frequent blood pressure checks, IV access, oxygen and responsive to questions  Block type: Axillary  R axillary  Laterality: right  Injection technique: single-shot  Guidance: ultrasound guided  Local infiltration: lidocaine  Infiltration strength: 2 %  Local infiltration: lidocaine  Dose: 2 mL    Needle   Needle type: insulated echogenic nerve stimulator needle   Needle gauge: 20 G  Needle localization: ultrasound guidance  Needle insertion depth: 4 cm  Test dose: negative  Needle length: 10 cm  Assessment   Injection assessment: negative aspiration for heme, local visualized surrounding nerve on ultrasound, no intravascular symptoms and no paresthesia on injection  Paresthesia pain: none  Slow fractionated injection: yes  Hemodynamics: stable  Outcomes: uncomplicated and patient tolerated procedure well    Additional Notes  0.5% Ropivacaine 15 ml + 2% Lidocaine 15 ml + Dexamethasone 8 mg (25 ml for right axillary nerve block, 5 ml for musculocutaneous nerve block)  Medications Administered  fentaNYL (SUBLIMAZE) injection - IntraVENous   100 mcg - 3/14/2025 1:27:00

## 2025-03-14 NOTE — ANESTHESIA PRE PROCEDURE
Department of Anesthesiology  Preprocedure Note       Name:  Kim Fuentes   Age:  49 y.o.  :  1975                                          MRN:  [id number]         Date:  3/14/2025      Surgeon: Surgeon(s):  Mary Byrne MD    Procedure: Procedure(s):  RIGHT DISTAL RADIUS OPEN REDUCTION INTERNAL FIXATION WITH MEDARTIS    Medications prior to admission:   Prior to Admission medications    Medication Sig Start Date End Date Taking? Authorizing Provider   HYDROcodone-acetaminophen (NORCO) 5-325 MG per tablet Take 1 tablet by mouth every 4 hours as needed for Pain for up to 3 days. Intended supply: 3 days. Take lowest dose possible to manage pain Max Daily Amount: 6 tablets 3/11/25 3/14/25 Yes Peggy Rhoades APRN - CNP   acetaminophen (TYLENOL) 500 MG tablet Take 2 tablets by mouth every 6 hours as needed for Pain 2/10/22   Donnell Choi MD   ibuprofen (ADVIL;MOTRIN) 600 MG tablet Take 1 tablet by mouth every 6 hours as needed for Pain 10/11/20 2/10/22  Saturnino Rogers MD       Current medications:    No current facility-administered medications for this encounter.       Allergies:    Allergies   Allergen Reactions   • Bactrim [Sulfamethoxazole-Trimethoprim] Anaphylaxis   • Succinylcholine      Slow to awaken, after surgery for hysterectomy       Problem List:    Patient Active Problem List   Diagnosis Code   • Submucous uterine fibroid D25.0   • Pelvic pain in female R10.2   • History of tubal ligation Z98.51   • 17 s/p LAVH with LS Z90.710   • Hx of Ectopic pregnancy and RS O00.90   • Closed fracture of right distal radius S52.501A       Past Medical History:        Diagnosis Date   • Abdominal bloating    • Fibromyalgia    • History of chickenpox    • Pneumonia     hx. of  yearly.   • Prolonged emergence from general anesthesia    • Uterine fibroid    • Vagina bleeding     relates bleeding every 2 week.   • Wrist fracture        Past Surgical History:        Procedure Laterality Date   • FOOT

## 2025-03-14 NOTE — ANESTHESIA POSTPROCEDURE EVALUATION
Department of Anesthesiology  Postprocedure Note    Patient: Kim Fuentes  MRN: [id number]  YOB: 1975  Date of evaluation: 3/14/2025    Procedure Summary       Date: 03/14/25 Room / Location: [address]    Anesthesia Start: 1448 Anesthesia Stop: 1617    Procedure: RIGHT DISTAL RADIUS OPEN REDUCTION INTERNAL FIXATION WITH MEDARTIS (Right) Diagnosis:       Closed fracture of right distal radius      (Closed fracture of right distal radius [S52.501A])    Surgeons: Mary Byrne MD Responsible Provider: Tammy Abdi MD    Anesthesia Type: general ASA Status: 2            Anesthesia Type: No value filed.    Oren Phase I: Oren Score: 5    Oren Phase II:      Anesthesia Post Evaluation    Patient location during evaluation: PACU  Patient participation: complete - patient participated  Level of consciousness: awake and alert  Pain score: 0  Airway patency: patent  Nausea & Vomiting: no nausea and no vomiting  Cardiovascular status: blood pressure returned to baseline  Respiratory status: acceptable and room air  Hydration status: euvolemic  Pain management: adequate and satisfactory to patient    No notable events documented.

## 2025-03-20 ENCOUNTER — OFFICE VISIT (OUTPATIENT)
Dept: ORTHOPEDIC SURGERY | Age: 50
End: 2025-03-20

## 2025-03-20 VITALS — WEIGHT: 110 LBS | RESPIRATION RATE: 14 BRPM | BODY MASS INDEX: 18.33 KG/M2 | HEIGHT: 65 IN

## 2025-03-20 DIAGNOSIS — Z98.890 STATUS POST OPEN REDUCTION AND INTERNAL FIXATION (ORIF) OF FRACTURE: Primary | ICD-10-CM

## 2025-03-20 DIAGNOSIS — Z87.81 STATUS POST OPEN REDUCTION AND INTERNAL FIXATION (ORIF) OF FRACTURE: Primary | ICD-10-CM

## 2025-03-20 PROCEDURE — 99024 POSTOP FOLLOW-UP VISIT: CPT

## 2025-03-20 NOTE — PROGRESS NOTES
Procedure: Right distal radius open reduction internal fixation  Date of procedure: 3/14/2025    HPI:  Kim Fuentes is a 49 y.o. female who is approximately 1 week status post aforementioned procedure.  Indicates that she is doing fairly well.  Her pain is rated as a 5/10.  She denies having any fevers, chills, sweats or any constitutional symptoms.  She states that she is doing fairly well and has not had to take any medication since last Friday.  She presents today as she endorses some issues with her splint as well as a burning sensation over the dorsal aspect of her forearm.  She does admit to loosening the Ace wrap on the splint.  She has been wearing her sling and has tried not to use the arm for any activity.  She has been moving her fingers since surgery.    Physical examination:  Evaluation of patient's right wrist and and upper extremity demonstrates her incisions to be covered with her operative dressing and it does appear intact without any blood soaking.  There is no warmth, erythema, wound dehiscence or drainage.  Sensation is grossly intact light touch in all dermatomes and she has a 2+ radial pulse with brisk capillary refill in her fingers.    Impression and plan:  Kim Fuentes is a 49 y.o. female who is 1 week status post a right distal radius open reduction internal fixation.  She is doing relatively well at this time.  I did take down the splint today to take a look at the surrounding skin and everything looked intact without concern.  I did provide her with new Webril and replaced the splint and new Ace wrap's over the top.  I did loosen the wrap a little bit today to make it more comfortable for her.  I discussed that the feeling she is having in the dorsal forearm and pinky can be normal after this type of surgery.  She should continue to ice, elevate the arm.  She may take anti-inflammatories over-the-counter as needed to help with pain control as well as the swelling.  She should continue to

## 2025-04-02 ENCOUNTER — OFFICE VISIT (OUTPATIENT)
Dept: ORTHOPEDIC SURGERY | Age: 50
End: 2025-04-02

## 2025-04-02 DIAGNOSIS — Z98.890 STATUS POST OPEN REDUCTION AND INTERNAL FIXATION (ORIF) OF FRACTURE: Primary | ICD-10-CM

## 2025-04-02 DIAGNOSIS — S52.501D CLOSED FRACTURE OF DISTAL END OF RIGHT RADIUS WITH ROUTINE HEALING, UNSPECIFIED FRACTURE MORPHOLOGY, SUBSEQUENT ENCOUNTER: ICD-10-CM

## 2025-04-02 DIAGNOSIS — Z87.81 STATUS POST OPEN REDUCTION AND INTERNAL FIXATION (ORIF) OF FRACTURE: Primary | ICD-10-CM

## 2025-04-02 PROCEDURE — 99024 POSTOP FOLLOW-UP VISIT: CPT | Performed by: ORTHOPAEDIC SURGERY

## 2025-04-02 NOTE — PROGRESS NOTES
Procedure: ORIF right distal radius fracture  Date of procedure: 3/14/2025    HPI: Ms. Fuentes is a 49-year-old lady approximately 3 weeks status post the aforementioned procedure.  She negates that she is doing fairly well.  Pain has been appropriately controlled.  She rates it as a 3/10 at its worst.  No associated numbness or tingling.  No fevers chills or sweats.    Physical examination:  Evaluation of the patient's right wrist and upper extremity demonstrates her incision to be healing appropriately without warmth erythema or wound dehiscence/drainage.  Moderate swelling present.  Sensation grossly intact light touch in all dermatomes.  2+ radial pulse with brisk cap refill in her fingers.    Imaging studies:  3 x-ray views of the right wrist completed on 4/2/2025 reviewed independently demonstrating a distal radius fracture in acceptable alignment with a volar based plate and screw construct.  No evidence of loosening or migration of the implants.  No obvious dislocation or subluxation.    Impression and plan: Ms. Fuentes is a 49-year-old lady approximately 3 weeks status post an ORIF right distal radius fracture.  She is doing very well at this time.  Today her sutures were taken out and Steri-Strips and clean dressings were applied.  She may get her incision wet in the shower.  She was placed in a removable wrist brace which she was encouraged to take 3-4 times a day to work on wrist motion.  A prescription for occupational therapy was also provided to facilitate working on wrist and hand motion as well as light progressive strengthening.  I will see her back for reevaluation in 3 to 4 weeks but she was encouraged to return or call earlier with any questions and or concerns.

## 2025-04-07 ENCOUNTER — HOSPITAL ENCOUNTER (OUTPATIENT)
Dept: OCCUPATIONAL THERAPY | Facility: CLINIC | Age: 50
Setting detail: THERAPIES SERIES
Discharge: HOME | End: 2025-04-07
Attending: ORTHOPAEDIC SURGERY

## 2025-04-07 PROCEDURE — 97110 THERAPEUTIC EXERCISES: CPT

## 2025-04-07 PROCEDURE — 97165 OT EVAL LOW COMPLEX 30 MIN: CPT

## 2025-04-07 NOTE — CONSULTS
[] [facility]  Outpatient Rehabilitation &  Therapy  [address]  [phone]  F: [phone] [] [facility]  Outpatient Rehabilitation &  Therapy  [address]   [address]  P: [phone]  F: [phone] [x] [facility]  Outpatient Rehabilitation &  Therapy  [address]  P: [phone]  F: [phone] [] [facility]  Outpatient Rehabilitation &  Therapy  [address]   P: [phone]  F: [phone] [] [facility]   Outpatient Rehabilitation   & Therapy  [address]  P: [phone]   F: [phone]     Occupational Therapy Hand & Upper Extremity  Initial Evaluation    Date: 2025      Patient: Kim Fuentes  : 1975  MRN: [id number]  Referring Provider:  Mary Byrne MD   Insurance: PATIENT IS APPLYING FOR FINANCIAL ASSISTANCE   Medical Diagnosis: Z98.890, Z87.81 (ICD-10-CM) - Status post open reduction and internal fixation (ORIF) of fracture, S52.501D (ICD-10-CM) - Closed fracture of distal end of right radius with routine healing, unspecified fracture morphology, subsequent encounter   Rehab Codes: stiffness in wrist M25.63,, effusion of wrist M25.43,, pain in right forearm M79.631,, or pain in right hand M79.641,  Onset Date: 3/11/25    Next  Appt: 25    Subjective:   CC: pn w/ certain movments   HPI: Pt reports she has 2 large dogs. She was hold on to the leashes when the dogs wrapped around her legs and ran causing her to be tripped an fell forward onto her R wrist. Pt states she broke her wrist and foot w/ the fall. Since sx she is doing well. Has a good amount of pn in the R wrist w/ movement.     Mechanism of Injury: Forward fall  Surgery Date: 3/14/25  Precautions: In 4 weeks may begin light progressive strengthening (25)     Involved Extremity: Right   Dominant Extremity: Right    Past Medical History:           Comorbidities:   Past Medical History:

## 2025-04-10 ENCOUNTER — HOSPITAL ENCOUNTER (OUTPATIENT)
Dept: OCCUPATIONAL THERAPY | Facility: CLINIC | Age: 50
Setting detail: THERAPIES SERIES
Discharge: HOME | End: 2025-04-10
Attending: ORTHOPAEDIC SURGERY

## 2025-04-10 PROCEDURE — 97110 THERAPEUTIC EXERCISES: CPT

## 2025-04-10 NOTE — FLOWSHEET NOTE
[] [facility]       Occupational Therapy            1st floor       [address], [place]         Phone: [phone]       Fax: [phone] [x] [facility]   [place] [facility]  [address] [place], [place]  Phone: [phone]  Fax: [phone] [] [facility]  Outpatient Rehabilitation &  Therapy  [address]   P: [phone]  F: [phone]     Occupational Therapy Daily Treatment Note    Date:  4/10/2025  Patient Name:  Kim Fuentes    :  1975  MRN: [id number]  Referring Provider:  Mary Byrne MD   Insurance: PATIENT IS APPLYING FOR FINANCIAL ASSISTANCE - Pt was advised to continue checking in Financial Assistance for updates on approval   Medical Diagnosis: Z98.890, Z87.81 (ICD-10-CM) - Status post open reduction and internal fixation (ORIF) of fracture, S52.501D (ICD-10-CM) - Closed fracture of distal end of right radius with routine healing, unspecified fracture morphology, subsequent encounter                    Rehab Codes: stiffness in wrist M25.63,, effusion of wrist M25.43,, pain in right forearm M79.631,, or pain in right hand M79.641,  Onset Date: 3/11/25                 Kian Nuñez Appt: 25  Visit# / total visits: ; Progress note for Medicare patient due at visit 8    Cancels/No Shows: 0/0      Subjective:    Pain:  [x] Yes  [] No Location: R wrist  Pain Rating: (0-10 scale) 1/10  Pain altered Tx:  [x] No  [] Yes  Action:  Pt Comments:  Pt reports she is doing okay, the R wrist is achy. Has been completing HEP as directed    Objective:  Modalities:  Precautions:  Exercises:  Exercise Reps/Time Weight/Level Comments Completed    Composite fist 25 AROM  X   Claw fist  3x10 AROM  -   Wrist AROM 25 AROM Flex/ext  Radial/ulnar dev X   Pronation/supination 3x10 AROM  X    foam cube transfer 1 series     X   FMC 1/2 container    Alternating marble  X   Grooved peg board    X   Other: Access Code: [email or web address]  URL:

## 2025-04-14 ENCOUNTER — HOSPITAL ENCOUNTER (OUTPATIENT)
Dept: OCCUPATIONAL THERAPY | Facility: CLINIC | Age: 50
Setting detail: THERAPIES SERIES
Discharge: HOME | End: 2025-04-14
Attending: ORTHOPAEDIC SURGERY

## 2025-04-14 NOTE — FLOWSHEET NOTE
[] [facility]       Occupational Therapy       [address]       Phone: [phone]       Fax: [phone] [x] [facility] Occupational  Therapy at [place]       [address]       Phone: [phone]       Fax: [phone] [] [facility]  Outpatient Rehabilitation &  Therapy  [address]   [address]  P: [phone]  F: [phone]           Occupational Therapy Cancel/No Show note    Date: 2025  Patient: Kim Fuentes  : 1975  MRN: [id number]  Cancels/No Shows to date:     For today's appointment patient:  [x]  Cancelled  []  Rescheduled appointment  []  No-show     Reason given by patient:  []  Patient ill  []  Conflicting appointment  []  No transportation    []  Conflict with work  []  No reason given  [x]  Other:     Comments:  financial concerns, looking into financial assistance.    Electronically signed by: ANNA Burrows

## 2025-04-16 ENCOUNTER — APPOINTMENT (OUTPATIENT)
Dept: OCCUPATIONAL THERAPY | Facility: CLINIC | Age: 50
End: 2025-04-16
Attending: ORTHOPAEDIC SURGERY

## 2025-04-21 ENCOUNTER — HOSPITAL ENCOUNTER (OUTPATIENT)
Dept: OCCUPATIONAL THERAPY | Facility: CLINIC | Age: 50
Setting detail: THERAPIES SERIES
Discharge: HOME | End: 2025-04-21
Attending: ORTHOPAEDIC SURGERY

## 2025-04-21 NOTE — FLOWSHEET NOTE
[] [facility]       Occupational Therapy       [address]       Phone: [phone]       Fax: [phone] [x] [facility] Occupational  Therapy at [place]       [address]       Phone: [phone]       Fax: [phone] [] [facility]  Outpatient Rehabilitation &  Therapy  [address]   [address]  P: [phone]  F: [phone]           Occupational Therapy Cancel/No Show note    Date: 2025  Patient: Kim Fuentes  : 1975  MRN: [id number]  Cancels/No Shows to date:     For today's appointment patient:  [x]  Cancelled  []  Rescheduled appointment  []  No-show     Reason given by patient:  []  Patient ill  []  Conflicting appointment  []  No transportation    []  Conflict with work  []  No reason given  [x]  Other:     Comments:  financial concerns, looking into financial assistance. Said she keeps calling Financial Aid and they keep telling her they haven't gotten anything said she is not doing it again and is not working so cannot afford OT requested to CX all appts.    Electronically signed by: Mariely Cueto/APARNA

## 2025-04-23 ENCOUNTER — APPOINTMENT (OUTPATIENT)
Dept: OCCUPATIONAL THERAPY | Facility: CLINIC | Age: 50
End: 2025-04-23
Attending: ORTHOPAEDIC SURGERY

## 2025-04-28 ENCOUNTER — APPOINTMENT (OUTPATIENT)
Dept: OCCUPATIONAL THERAPY | Facility: CLINIC | Age: 50
End: 2025-04-28
Attending: ORTHOPAEDIC SURGERY

## 2025-04-30 ENCOUNTER — OFFICE VISIT (OUTPATIENT)
Dept: ORTHOPEDIC SURGERY | Age: 50
End: 2025-04-30

## 2025-04-30 ENCOUNTER — APPOINTMENT (OUTPATIENT)
Dept: OCCUPATIONAL THERAPY | Facility: CLINIC | Age: 50
End: 2025-04-30
Attending: ORTHOPAEDIC SURGERY

## 2025-04-30 VITALS — WEIGHT: 110 LBS | BODY MASS INDEX: 18.33 KG/M2 | HEIGHT: 65 IN | RESPIRATION RATE: 14 BRPM

## 2025-04-30 DIAGNOSIS — S52.501D CLOSED FRACTURE OF DISTAL END OF RIGHT RADIUS WITH ROUTINE HEALING, UNSPECIFIED FRACTURE MORPHOLOGY, SUBSEQUENT ENCOUNTER: ICD-10-CM

## 2025-04-30 DIAGNOSIS — Z87.81 STATUS POST OPEN REDUCTION AND INTERNAL FIXATION (ORIF) OF FRACTURE: Primary | ICD-10-CM

## 2025-04-30 DIAGNOSIS — Z98.890 STATUS POST OPEN REDUCTION AND INTERNAL FIXATION (ORIF) OF FRACTURE: Primary | ICD-10-CM

## 2025-04-30 PROCEDURE — 99024 POSTOP FOLLOW-UP VISIT: CPT

## 2025-04-30 NOTE — PROGRESS NOTES
Procedure: ORIF right distal radius fracture  Date of procedure: 3/14/2025    HPI: Ms. Fuentes is a 49-year-old lady approximately 7 weeks status post the aforementioned procedure.  She states that overall she is doing fairly well.  Continues to be adequately controlled and really has no pain most of the day but does endorse some achiness and stiffness depending on her activity.  States that she has noticed an improvement in her range of motion.  She rates her pain today as a 2/10.  She attended 2 sessions of formal occupational therapy but states it was becoming too expensive out-of-pocket and so she did continue the home exercise program at home and states that it is going well.  She is worried about the lack of motion as well as pain over the incision.  Denies any new symptoms today.  She did discontinue her removable wrist brace on Friday.    Physical examination:  Evaluation of the patient's right wrist and upper extremity demonstrates her incision to be healing appropriately without warmth erythema or wound dehiscence/drainage.  No significant swelling present to the hand or wrist.  Sensation grossly intact light touch in all dermatomes.  2+ radial pulse with brisk cap refill in her fingers.  She is significantly restricted with flexion and extension at the wrist with no restriction when performing ulnar and radial deviation.  Full range of motion at the elbow but does have decreased pronation and supination.    Imaging studies:  3 x-ray views of the right wrist completed on 4/30/2025 reviewed independently demonstrating a distal radius fracture in acceptable alignment with a volar based plate and screw construct.  No evidence of loosening or migration of the implants.  No obvious dislocation or subluxation.  Increased callus formation through the fracture site.    Impression and plan: Ms. Fuentes is a 49-year-old lady approximately 7 weeks status post an ORIF right distal radius fracture.  She is continuing to do

## 2025-06-11 ENCOUNTER — OFFICE VISIT (OUTPATIENT)
Dept: ORTHOPEDIC SURGERY | Age: 50
End: 2025-06-11

## 2025-06-11 VITALS — WEIGHT: 110 LBS | BODY MASS INDEX: 18.33 KG/M2 | HEIGHT: 65 IN | RESPIRATION RATE: 14 BRPM

## 2025-06-11 DIAGNOSIS — S52.501D CLOSED FRACTURE OF DISTAL END OF RIGHT RADIUS WITH ROUTINE HEALING, UNSPECIFIED FRACTURE MORPHOLOGY, SUBSEQUENT ENCOUNTER: ICD-10-CM

## 2025-06-11 DIAGNOSIS — Z98.890 STATUS POST OPEN REDUCTION AND INTERNAL FIXATION (ORIF) OF FRACTURE: Primary | ICD-10-CM

## 2025-06-11 DIAGNOSIS — Z87.81 STATUS POST OPEN REDUCTION AND INTERNAL FIXATION (ORIF) OF FRACTURE: Primary | ICD-10-CM

## 2025-06-11 PROCEDURE — 99024 POSTOP FOLLOW-UP VISIT: CPT | Performed by: ORTHOPAEDIC SURGERY

## 2025-06-13 NOTE — PROGRESS NOTES
Procedure: ORIF right distal radius fracture  Date of procedure: 3/14/2025    HPI: Ms. Fuentes is a 49-year-old lady approximately 3 months status post the aforementioned procedure.  She states that she is doing well having no pain in her wrist.  She is kept up with her home exercise program.    Physical examination:  Evaluation of the patient's right wrist and upper extremity demonstrates her incision to be appropriately healed.  Sensation is grossly intact light touch in all dermatomes.  No significant swelling present.  She has good wrist flexion and extension and is able to actively flex, extend, abduct and adductor all of her fingers.    Imaging studies:  3 x-ray views of the right wrist completed on 6/11/2025 reviewed independently demonstrating a distal radius fracture in acceptable alignment with a volar based plate and screw construct.  Distal radius fracture appears to be fully healed.    Impression and plan: Ms. Fuentes is a 49-year-old lady approximately 3 3 months status post an ORIF right distal radius fracture.  She continues to do very well clinically and radiographically and appears to be fully healed.  She was encouraged to keep up with her home exercise program and can continue to increase use of this arm as she feels comfortable.  I will see her back in my clinic as needed but she may return or call at anytime with questions or concerns.

## 2025-07-20 ENCOUNTER — HOSPITAL ENCOUNTER (EMERGENCY)
Age: 50
Discharge: HOME | End: 2025-07-20

## 2025-07-20 VITALS
SYSTOLIC BLOOD PRESSURE: 157 MMHG | BODY MASS INDEX: 18.33 KG/M2 | DIASTOLIC BLOOD PRESSURE: 100 MMHG | TEMPERATURE: 98.6 F | HEIGHT: 65 IN | OXYGEN SATURATION: 100 % | HEART RATE: 86 BPM | RESPIRATION RATE: 18 BRPM | WEIGHT: 110 LBS

## 2025-07-20 DIAGNOSIS — K05.10 GINGIVITIS: ICD-10-CM

## 2025-07-20 DIAGNOSIS — K08.89 ODONTALGIA: Primary | ICD-10-CM

## 2025-07-20 PROCEDURE — 6370000000 HC RX 637 (ALT 250 FOR IP): Performed by: PHYSICIAN ASSISTANT

## 2025-07-20 PROCEDURE — 99283 EMERGENCY DEPT VISIT LOW MDM: CPT

## 2025-07-20 RX ORDER — AMOXICILLIN 250 MG/1
750 CAPSULE ORAL ONCE
Status: COMPLETED | OUTPATIENT
Start: 2025-07-20 | End: 2025-07-20

## 2025-07-20 RX ORDER — AMOXICILLIN 875 MG/1
875 TABLET, COATED ORAL 2 TIMES DAILY
Qty: 20 TABLET | Refills: 0 | Status: SHIPPED | OUTPATIENT
Start: 2025-07-20 | End: 2025-07-30

## 2025-07-20 RX ADMIN — AMOXICILLIN 750 MG: 250 CAPSULE ORAL at 19:32

## 2025-07-20 ASSESSMENT — PAIN SCALES - GENERAL: PAINLEVEL_OUTOF10: 8

## 2025-07-20 ASSESSMENT — PAIN - FUNCTIONAL ASSESSMENT: PAIN_FUNCTIONAL_ASSESSMENT: 0-10

## 2025-07-20 NOTE — DISCHARGE INSTRUCTIONS
Take the antibiotics as prescribed and finish the course in its entirety.  Utilize ibuprofen around the clock for pain and Tylenol in the interim if needed.  You can also use Orajel or clove oil as well as decaf teabags for some relief.  Avoid foods that instigate lots of chewing until the pain is improved and avoid chewing on that side.  Be sure to use warm salt water rinses after each meal or after every time you eat.  You can also do an occasional half peroxide half warm water rinse once a day to help with cleanliness.  Be sure to brush her teeth regularly.  Contact your dentist as soon as possible for follow-up.    Please understand that at this time there is no evidence for a more serious underlying process, but that early in the process of an illness or injury, an emergency department workup can be falsely reassuring.  You should contact your family doctor within the next 24 hours for a follow up appointment    THANK YOU!!!    From [facility] and [facility]    On behalf of the Emergency Department staff at [facility], I would like to thank you for giving us the opportunity to address your health care needs and concerns.    We hope that during your visit, our service was delivered in a professional and caring manner. Please keep [facility] in mind as we walk with you down the path to your own personal wellness.     Please expect an automated text message or email from us so we can ask a few questions about your health and progress. Based on your answers, a clinician may call you back to offer help and instructions.    Please understand that early in the process of an illness or injury, an emergency department workup can be falsely reassuring.  If you notice any worsening, changing or persistent symptoms please call your family doctor or return to the ER immediately.     Tell us how we did during your visit at [email or web address]   and let us know about your experience

## 2025-07-25 NOTE — ED PROVIDER NOTES
Emergency Department     Faculty Attestation    I discussed management with the mid level provider. I reviewed the mid level provider's note and agree with the documented findings and plan of care. Any areas of disagreement are noted on the chart. I was personally present for the key portions of any procedures. I have documented in the chart those procedures where I was not present during the key portions. I have reviewed the emergency nurses triage note. I agree with the chief complaint, past medical history, past surgical history, allergies, medications, social and family history as documented unless otherwise noted below. Documentation of the HPI, Physical Exam and Medical Decision Making performed by medical students or scribes is based on my personal performance of the HPI, PE and MDM. Additional findings are as noted.      Primary Care Physician:  No primary care provider on file.    CHIEF COMPLAINT       Chief Complaint   Patient presents with    Dental Pain     Patient c/o increased pain on the left side of her mouth, lower and upper. Patient states she \"thinks she has some abscess in her mouth for the past week\".       RECENT VITALS:   Temp: 98.6 °F (37 °C),  Pulse: 86, Respirations: 18, BP: (!) 157/100    LABS:  Labs Reviewed - No data to display      PERTINENT ATTENDING PHYSICIAN COMMENTS:    Seen by the PA. Patient concern for dental abscess, describes left dental pain gradual in onset, history of the same. Nothing drainable seen on exam, looks well, non-toxic. Prescribed amoxicillin, dentistry follow-up.         Gilbert Vidal MD  07/25/25 0165    
hoarseness or muffled voice.    Neck: Supple and symmetrical. Trachea midline. No submandibular edema or LAD. No JVD.   Lungs:   No respiratory distress. No stridor. Clear to auscultation bilaterally. No wheezes, rhonchi, or rales.   Heart:  Regular rate. Regular rhythm. No murmurs, rubs, or gallops.   Skin: Soft, good turgor, and well-hydrated. No obvious rashes or lesions.   Neurologic: GCS is 15 and no focal deficits are appreciated. Normal gait. Grossly normal motor and sensation. Speech clear.   Psychiatric: Normal mood and affect. Normal behavior. Coherent thought process.     DIFFERENTIAL DIAGNOSIS / MDM     Patient presents to the emergency department with dental pain as described above. Vital signs are stable, mild hypertension likely secondary to ED visit and pain. Patient is afebrile, nontoxic-appearing, and in no acute distress. They are handling their secretions. There is no airway compromise, uvular deviation, epiglottitis, Jesse's angina, trismus, traumatic malocclusion, or signs of drainable abscess. Will discharge home with a course of antibiotics, advised non-narcotic supportive care instructions, and importance of follow-up with a dentist as soon as possible.     The patient appears stable for discharge and has been instructed to return immediately for new concerning symptoms or if the symptoms worsen in any way. We have discussed the symptoms associated with the patient's presentation which are most concerning like fever (new or persistent with antipyretic usage), changing or worsening pain/swelling, difficulty swallowing, difficulty breathing, gagging or vomiting foods back up, abdominal pain, chest pain, lethargy, syncope, etc. that necessitate immediate return. The patient understands that at this time there is no evidence for a more malignant underlying process, but the patient also understands that early in the process of an illness or injury, an emergency department workup can be falsely

## 2025-08-12 ENCOUNTER — HOSPITAL ENCOUNTER (EMERGENCY)
Age: 50
Discharge: HOME | End: 2025-08-12
Attending: EMERGENCY MEDICINE

## 2025-08-12 VITALS
RESPIRATION RATE: 16 BRPM | OXYGEN SATURATION: 98 % | TEMPERATURE: 98.1 F | HEIGHT: 65 IN | DIASTOLIC BLOOD PRESSURE: 115 MMHG | SYSTOLIC BLOOD PRESSURE: 166 MMHG | WEIGHT: 110 LBS | BODY MASS INDEX: 18.33 KG/M2 | HEART RATE: 84 BPM

## 2025-08-12 DIAGNOSIS — K02.9 DENTAL CARIES: Primary | ICD-10-CM

## 2025-08-12 PROCEDURE — 99283 EMERGENCY DEPT VISIT LOW MDM: CPT | Performed by: EMERGENCY MEDICINE

## 2025-08-12 PROCEDURE — 6370000000 HC RX 637 (ALT 250 FOR IP): Performed by: EMERGENCY MEDICINE

## 2025-08-12 RX ORDER — AMOXICILLIN 500 MG/1
500 CAPSULE ORAL 3 TIMES DAILY
Qty: 21 CAPSULE | Refills: 0 | Status: SHIPPED | OUTPATIENT
Start: 2025-08-12 | End: 2025-08-19

## 2025-08-12 RX ORDER — HYDROCODONE BITARTRATE AND ACETAMINOPHEN 5; 325 MG/1; MG/1
2 TABLET ORAL ONCE
Status: COMPLETED | OUTPATIENT
Start: 2025-08-12 | End: 2025-08-12

## 2025-08-12 RX ADMIN — HYDROCODONE BITARTRATE AND ACETAMINOPHEN 2 TABLET: 5; 325 TABLET ORAL at 02:59

## 2025-08-12 ASSESSMENT — ENCOUNTER SYMPTOMS
EYE DISCHARGE: 0
VOMITING: 0
WHEEZING: 0
COLOR CHANGE: 0
STRIDOR: 0
EYE REDNESS: 0
COUGH: 0
SHORTNESS OF BREATH: 0
EYE PAIN: 0
CONSTIPATION: 0
SORE THROAT: 0
NAUSEA: 0
DIARRHEA: 0
ABDOMINAL PAIN: 0

## 2025-08-12 ASSESSMENT — PAIN - FUNCTIONAL ASSESSMENT: PAIN_FUNCTIONAL_ASSESSMENT: 0-10

## 2025-08-12 ASSESSMENT — PAIN DESCRIPTION - ONSET: ONSET: ON-GOING

## 2025-08-12 ASSESSMENT — PAIN DESCRIPTION - ORIENTATION: ORIENTATION: LEFT

## 2025-08-12 ASSESSMENT — PAIN DESCRIPTION - LOCATION: LOCATION: JAW

## 2025-08-12 ASSESSMENT — PAIN DESCRIPTION - PAIN TYPE: TYPE: ACUTE PAIN

## 2025-08-12 ASSESSMENT — PAIN SCALES - GENERAL: PAINLEVEL_OUTOF10: 10

## 2025-08-12 ASSESSMENT — PAIN DESCRIPTION - FREQUENCY: FREQUENCY: CONTINUOUS

## (undated) DEVICE — ST CHARLES GYN LAPAROSCOPY PK: Brand: MEDLINE INDUSTRIES, INC.

## (undated) DEVICE — PENCIL ES L3M BTTN SWCH HOLSTER W/ BLDE ELECTRD EDGE

## (undated) DEVICE — DRAPE,U/ SHT,SPLIT,PLAS,STERIL: Brand: MEDLINE

## (undated) DEVICE — YANKAUER,BULB TIP,W/O VENT,RIGID,STERILE: Brand: MEDLINE

## (undated) DEVICE — SUTURE PROL SZ 3-0 L18IN NONABSORBABLE BLU L30MM PS-1 3/8 8663G

## (undated) DEVICE — DRAPE SURG W41XL74IN CLR FULL SZ C ARM 3 ADH POLY STRP E

## (undated) DEVICE — 3M™ STERI-STRIP™ WOUND CLOSURE SYSTEMS 5 EACH/PACK 25 PACKS/CARTON 4 CARTONS/CASE W8516: Brand: 3M™ STERI-STRIP™

## (undated) DEVICE — SUTURE VCRL + SZ 0 L27IN ABSRB UD L36MM CT-1 1/2 CIR VCPP41D

## (undated) DEVICE — SUTURE VCRL + SZ 4-0 L27IN ABSRB WHT FS-2 3/8 CIR REV CUT VCP422H

## (undated) DEVICE — TRAY URIN CATH 16FR DRNGE BG STATLOK STBL DEV F SURSTP

## (undated) DEVICE — GAUZE,SPONGE,4"X4",16PLY,XRAY,STRL,LF: Brand: MEDLINE

## (undated) DEVICE — TUBING, SUCTION, 9/32" X 20', STRAIGHT: Brand: MEDLINE INDUSTRIES, INC.

## (undated) DEVICE — GLOVE SURG SZ 8 L12IN THK75MIL DK GRN LTX FREE

## (undated) DEVICE — SCISSOR SURG CRV ENDOCUT TIP FOR LAP DISP

## (undated) DEVICE — 40580 - THE PINK PAD - ADVANCED TRENDELENBURG POSITIONING KIT: Brand: 40580 - THE PINK PAD - ADVANCED TRENDELENBURG POSITIONING KIT

## (undated) DEVICE — SUTURE VCRL + SZ 1 L36IN ABSRB UD L36MM CT-1 1/2 CIR VCP947H

## (undated) DEVICE — GLOVE ORANGE PI 7   MSG9070

## (undated) DEVICE — PADDING,UNDERCAST,COTTON, 4"X4YD STERILE: Brand: MEDLINE

## (undated) DEVICE — MHPB HAND AND FOOT PACK: Brand: MEDLINE INDUSTRIES, INC.

## (undated) DEVICE — 4-PORT MANIFOLD: Brand: NEPTUNE 2

## (undated) DEVICE — TROCAR: Brand: KII FIOS FIRST ENTRY

## (undated) DEVICE — FORCEPS BPLR L33CM DISECT TWO TIER JAW DSGN W/ SERR SURF

## (undated) DEVICE — SUTURE VICRYL + SZ 3-0 L27IN ABSRB UD L26MM SH 1/2 CIR VCP416H

## (undated) DEVICE — BLADE ES L6IN ELASTOMERIC COAT EXT DURABLE BEND UPTO 90DEG

## (undated) DEVICE — SPLINT THMB W3XL12IN FBRGLS PD PRECUT LTWT DURABLE FAST SET

## (undated) DEVICE — TUBING SUCT 12FR MAL ALUM SHFT FN CAP VENT UNIV CONN W/ OBT

## (undated) DEVICE — GOWN,AURORA,NONREINFORCED,LARGE: Brand: MEDLINE

## (undated) DEVICE — Z INACTIVE USE 2660664 SOLUTION IRRIG 3000ML 0.9% SOD CHL USP UROMATIC PLAS CONT

## (undated) DEVICE — TUBING, SUCTION, 3/16" X 10', STRAIGHT: Brand: MEDLINE

## (undated) DEVICE — TRI-LUMEN FILTERED TUBE SET WITH ACTIVATED CHARCOAL FILTER: Brand: AIRSEAL

## (undated) DEVICE — TROCAR: Brand: KII® SLEEVE

## (undated) DEVICE — GLOVE SURG SZ 75 L12IN THK75MIL DK GRN LTX FREE

## (undated) DEVICE — COVER LT HNDL BLU PLAS

## (undated) DEVICE — SOLUTION ANTIFOG VIS SYS CLEARIFY LAPSCP

## (undated) DEVICE — AIRSEAL 5 MM ACCESS PORT AND LOW PROFILE OBTURATOR WITH BLADELESS OPTICAL TIP, 120 MM LENGTH: Brand: AIRSEAL

## (undated) DEVICE — APPLICATOR SURG XL L38CM FOR ARISTA ABSRB HEMSTAT FLEXITIP

## (undated) DEVICE — LEGGINGS, PAIR, CLEAR, STERILE: Brand: MEDLINE

## (undated) DEVICE — Device

## (undated) DEVICE — SKIN AFFIX SURG ADHESIVE 72/CS 0.55ML: Brand: MEDLINE

## (undated) DEVICE — GLOVE SURG BEAD CUF 7 STD PF WHT STRL TRIUMPH LT LTX

## (undated) DEVICE — IMMOBILIZER SHLDR M ENV L15IN D8IN POLY COT CLIN SLNG W/

## (undated) DEVICE — LIQUIBAND RAPID ADHESIVE 36/CS 0.8ML: Brand: MEDLINE

## (undated) DEVICE — ELECTRODE PT RET AD L9FT HI MOIST COND ADH HYDRGEL CORDED

## (undated) DEVICE — SYRINGE MED 10ML LUERLOCK TIP W/O SFTY DISP

## (undated) DEVICE — GLOVE ORANGE PI 8   MSG9080

## (undated) DEVICE — FORCEPS SURG 33X3CM CUT W/ CRD 9 PIN PLASMACISION 920005PK] OLYMPUS SURGICAL TECH - GYRUS]

## (undated) DEVICE — PAD MATERNITY CURITY ADH STRIP DISP

## (undated) DEVICE — AGENT HEMSTAT 5GM ARISTA AH

## (undated) DEVICE — TROCARS: Brand: KII® BLUNT TIP ACCESS SYSTEM

## (undated) DEVICE — SOLUTION IRRIG 1000ML 0.9% SOD CHL USP POUR PLAS BTL

## (undated) DEVICE — APPLICATOR MEDICATED 26 CC SOLUTION HI LT ORNG CHLORAPREP

## (undated) DEVICE — SUTURE PDS + SZ 0 L27IN ABSRB VLT L26MM CT 2 1 2 CIR PDP334H